# Patient Record
Sex: MALE | Race: WHITE | NOT HISPANIC OR LATINO | Employment: OTHER | URBAN - METROPOLITAN AREA
[De-identification: names, ages, dates, MRNs, and addresses within clinical notes are randomized per-mention and may not be internally consistent; named-entity substitution may affect disease eponyms.]

---

## 2017-10-09 PROBLEM — F32.A DEPRESSION: Status: ACTIVE | Noted: 2017-10-09

## 2017-10-10 PROBLEM — M54.9 BACK PAIN: Status: ACTIVE | Noted: 2017-10-10

## 2017-10-10 PROBLEM — F10.20 UNCOMPLICATED ALCOHOL DEPENDENCE: Status: ACTIVE | Noted: 2017-10-10

## 2017-10-10 PROBLEM — Z72.0 NICOTINE ABUSE: Status: ACTIVE | Noted: 2017-10-10

## 2017-10-10 PROBLEM — F41.9 ANXIETY DISORDER: Status: ACTIVE | Noted: 2017-10-10

## 2017-10-10 PROBLEM — F11.20 UNCOMPLICATED OPIOID DEPENDENCE: Status: ACTIVE | Noted: 2017-10-10

## 2022-07-20 ENCOUNTER — HOSPITAL ENCOUNTER (EMERGENCY)
Facility: HOSPITAL | Age: 47
Discharge: STILL ADMITTED OR ADMINISTRATIVE | End: 2022-07-20
Attending: FAMILY MEDICINE
Payer: MEDICAID

## 2022-07-20 ENCOUNTER — HOSPITAL ENCOUNTER (INPATIENT)
Facility: HOSPITAL | Age: 47
LOS: 6 days | Discharge: HOME HEALTH | DRG: 030 | End: 2022-07-26
Attending: EMERGENCY MEDICINE | Admitting: STUDENT IN AN ORGANIZED HEALTH CARE EDUCATION/TRAINING PROGRAM
Payer: MEDICAID

## 2022-07-20 ENCOUNTER — ANESTHESIA EVENT (OUTPATIENT)
Dept: SURGERY | Facility: HOSPITAL | Age: 47
DRG: 030 | End: 2022-07-20
Payer: MEDICAID

## 2022-07-20 ENCOUNTER — ANESTHESIA (OUTPATIENT)
Dept: SURGERY | Facility: HOSPITAL | Age: 47
DRG: 030 | End: 2022-07-20
Payer: MEDICAID

## 2022-07-20 VITALS
DIASTOLIC BLOOD PRESSURE: 90 MMHG | HEIGHT: 71 IN | OXYGEN SATURATION: 100 % | BODY MASS INDEX: 22.38 KG/M2 | SYSTOLIC BLOOD PRESSURE: 133 MMHG | TEMPERATURE: 97 F | RESPIRATION RATE: 18 BRPM | HEART RATE: 81 BPM

## 2022-07-20 DIAGNOSIS — R20.0 PERINEAL NUMBNESS: Primary | ICD-10-CM

## 2022-07-20 DIAGNOSIS — R68.89 SUSPECTED CAUDA EQUINA SYNDROME: Primary | ICD-10-CM

## 2022-07-20 DIAGNOSIS — N39.0 URINARY TRACT INFECTION WITHOUT HEMATURIA, SITE UNSPECIFIED: ICD-10-CM

## 2022-07-20 DIAGNOSIS — M54.9 BACK PAIN, UNSPECIFIED BACK LOCATION, UNSPECIFIED BACK PAIN LATERALITY, UNSPECIFIED CHRONICITY: ICD-10-CM

## 2022-07-20 DIAGNOSIS — N39.42 URINARY INCONTINENCE WITHOUT SENSORY AWARENESS: ICD-10-CM

## 2022-07-20 DIAGNOSIS — R20.0 NUMBNESS: ICD-10-CM

## 2022-07-20 DIAGNOSIS — R33.8 ACUTE URINARY RETENTION: ICD-10-CM

## 2022-07-20 DIAGNOSIS — K62.89 DECREASED RECTAL SPHINCTER TONE: ICD-10-CM

## 2022-07-20 DIAGNOSIS — R07.9 CHEST PAIN: ICD-10-CM

## 2022-07-20 LAB
ALBUMIN SERPL-MCNC: 3.7 GM/DL (ref 3.5–5)
ALBUMIN/GLOB SERPL: 1.1 RATIO (ref 1.1–2)
ALP SERPL-CCNC: 68 UNIT/L (ref 40–150)
ALT SERPL-CCNC: 16 UNIT/L (ref 0–55)
AMPHET UR QL SCN: NEGATIVE
APPEARANCE UR: ABNORMAL
AST SERPL-CCNC: 15 UNIT/L (ref 5–34)
BACTERIA #/AREA URNS AUTO: ABNORMAL /HPF
BARBITURATE SCN PRESENT UR: NEGATIVE
BASOPHILS # BLD AUTO: 0.06 X10(3)/MCL (ref 0–0.2)
BASOPHILS NFR BLD AUTO: 0.3 %
BENZODIAZ UR QL SCN: NEGATIVE
BILIRUB UR QL STRIP.AUTO: ABNORMAL MG/DL
BILIRUBIN DIRECT+TOT PNL SERPL-MCNC: 0.7 MG/DL
BUN SERPL-MCNC: 17.3 MG/DL (ref 8.9–20.6)
CALCIUM SERPL-MCNC: 9.6 MG/DL (ref 8.4–10.2)
CANNABINOIDS UR QL SCN: NEGATIVE
CHLORIDE SERPL-SCNC: 100 MMOL/L (ref 98–107)
CK SERPL-CCNC: 71 U/L (ref 30–200)
CO2 SERPL-SCNC: 31 MMOL/L (ref 22–29)
COCAINE UR QL SCN: NEGATIVE
COLOR UR AUTO: ABNORMAL
CREAT SERPL-MCNC: 1.03 MG/DL (ref 0.73–1.18)
CRP SERPL-MCNC: 173 MG/L
EOSINOPHIL # BLD AUTO: 0.24 X10(3)/MCL (ref 0–0.9)
EOSINOPHIL NFR BLD AUTO: 1.2 %
ERYTHROCYTE [DISTWIDTH] IN BLOOD BY AUTOMATED COUNT: 13.3 % (ref 11.5–17)
ERYTHROCYTE [SEDIMENTATION RATE] IN BLOOD: 22 MM/HR (ref 0–15)
FENTANYL UR QL SCN: NEGATIVE
GLOBULIN SER-MCNC: 3.5 GM/DL (ref 2.4–3.5)
GLUCOSE SERPL-MCNC: 97 MG/DL (ref 74–100)
GLUCOSE UR QL STRIP.AUTO: NEGATIVE MG/DL
HCT VFR BLD AUTO: 45.8 % (ref 42–52)
HGB BLD-MCNC: 14.6 GM/DL (ref 14–18)
IMM GRANULOCYTES # BLD AUTO: 0.07 X10(3)/MCL (ref 0–0.04)
IMM GRANULOCYTES NFR BLD AUTO: 0.4 %
KETONES UR QL STRIP.AUTO: ABNORMAL MG/DL
LEUKOCYTE ESTERASE UR QL STRIP.AUTO: ABNORMAL UNIT/L
LYMPHOCYTES # BLD AUTO: 2.11 X10(3)/MCL (ref 0.6–4.6)
LYMPHOCYTES NFR BLD AUTO: 10.8 %
MCH RBC QN AUTO: 30.7 PG (ref 27–31)
MCHC RBC AUTO-ENTMCNC: 31.9 MG/DL (ref 33–36)
MCV RBC AUTO: 96.4 FL (ref 80–94)
MDMA UR QL SCN: NEGATIVE
MONOCYTES # BLD AUTO: 1.07 X10(3)/MCL (ref 0.1–1.3)
MONOCYTES NFR BLD AUTO: 5.5 %
NEUTROPHILS # BLD AUTO: 16 X10(3)/MCL (ref 2.1–9.2)
NEUTROPHILS NFR BLD AUTO: 81.8 %
NITRITE UR QL STRIP.AUTO: NEGATIVE
NRBC BLD AUTO-RTO: 0 %
OPIATES UR QL SCN: NEGATIVE
PCP UR QL: NEGATIVE
PH UR STRIP.AUTO: 5.5 [PH]
PH UR: 5.5 [PH] (ref 3–11)
PLATELET # BLD AUTO: 237 X10(3)/MCL (ref 130–400)
PMV BLD AUTO: 10.4 FL (ref 7.4–10.4)
POTASSIUM SERPL-SCNC: 3.7 MMOL/L (ref 3.5–5.1)
PROT SERPL-MCNC: 7.2 GM/DL (ref 6.4–8.3)
PROT UR QL STRIP.AUTO: ABNORMAL MG/DL
RBC # BLD AUTO: 4.75 X10(6)/MCL (ref 4.7–6.1)
RBC #/AREA URNS AUTO: <5 /HPF
RBC UR QL AUTO: ABNORMAL UNIT/L
SODIUM SERPL-SCNC: 138 MMOL/L (ref 136–145)
SP GR UR STRIP.AUTO: 1.02 (ref 1–1.03)
SPECIFIC GRAVITY, URINE AUTO (.000) (OHS): 1.02 (ref 1–1.03)
SQUAMOUS #/AREA URNS AUTO: <5 /HPF
UROBILINOGEN UR STRIP-ACNC: 1 MG/DL
WBC # SPEC AUTO: 19.5 X10(3)/MCL (ref 4.5–11.5)
WBC #/AREA URNS AUTO: 340 /HPF

## 2022-07-20 PROCEDURE — 80053 COMPREHEN METABOLIC PANEL: CPT | Performed by: FAMILY MEDICINE

## 2022-07-20 PROCEDURE — 36415 COLL VENOUS BLD VENIPUNCTURE: CPT | Performed by: EMERGENCY MEDICINE

## 2022-07-20 PROCEDURE — 25000003 PHARM REV CODE 250: Performed by: EMERGENCY MEDICINE

## 2022-07-20 PROCEDURE — 25000003 PHARM REV CODE 250: Performed by: STUDENT IN AN ORGANIZED HEALTH CARE EDUCATION/TRAINING PROGRAM

## 2022-07-20 PROCEDURE — 63600175 PHARM REV CODE 636 W HCPCS

## 2022-07-20 PROCEDURE — 81001 URINALYSIS AUTO W/SCOPE: CPT | Performed by: EMERGENCY MEDICINE

## 2022-07-20 PROCEDURE — 96374 THER/PROPH/DIAG INJ IV PUSH: CPT

## 2022-07-20 PROCEDURE — 63600175 PHARM REV CODE 636 W HCPCS: Performed by: STUDENT IN AN ORGANIZED HEALTH CARE EDUCATION/TRAINING PROGRAM

## 2022-07-20 PROCEDURE — 63600175 PHARM REV CODE 636 W HCPCS: Performed by: EMERGENCY MEDICINE

## 2022-07-20 PROCEDURE — 86140 C-REACTIVE PROTEIN: CPT | Performed by: EMERGENCY MEDICINE

## 2022-07-20 PROCEDURE — 51702 INSERT TEMP BLADDER CATH: CPT

## 2022-07-20 PROCEDURE — 99291 CRITICAL CARE FIRST HOUR: CPT | Mod: 25

## 2022-07-20 PROCEDURE — 36415 COLL VENOUS BLD VENIPUNCTURE: CPT | Performed by: FAMILY MEDICINE

## 2022-07-20 PROCEDURE — 25000003 PHARM REV CODE 250

## 2022-07-20 PROCEDURE — 85651 RBC SED RATE NONAUTOMATED: CPT | Performed by: EMERGENCY MEDICINE

## 2022-07-20 PROCEDURE — 87040 BLOOD CULTURE FOR BACTERIA: CPT | Performed by: NURSE PRACTITIONER

## 2022-07-20 PROCEDURE — 96375 TX/PRO/DX INJ NEW DRUG ADDON: CPT

## 2022-07-20 PROCEDURE — 80307 DRUG TEST PRSMV CHEM ANLYZR: CPT | Performed by: EMERGENCY MEDICINE

## 2022-07-20 PROCEDURE — 71000033 HC RECOVERY, INTIAL HOUR: Performed by: ANESTHESIOLOGY

## 2022-07-20 PROCEDURE — 25000003 PHARM REV CODE 250: Performed by: NURSE PRACTITIONER

## 2022-07-20 PROCEDURE — 25500020 PHARM REV CODE 255: Performed by: EMERGENCY MEDICINE

## 2022-07-20 PROCEDURE — 87077 CULTURE AEROBIC IDENTIFY: CPT | Performed by: EMERGENCY MEDICINE

## 2022-07-20 PROCEDURE — 82550 ASSAY OF CK (CPK): CPT | Performed by: EMERGENCY MEDICINE

## 2022-07-20 PROCEDURE — 85025 COMPLETE CBC W/AUTO DIFF WBC: CPT | Performed by: FAMILY MEDICINE

## 2022-07-20 PROCEDURE — 11000001 HC ACUTE MED/SURG PRIVATE ROOM

## 2022-07-20 PROCEDURE — 25500020 PHARM REV CODE 255: Performed by: STUDENT IN AN ORGANIZED HEALTH CARE EDUCATION/TRAINING PROGRAM

## 2022-07-20 PROCEDURE — A9577 INJ MULTIHANCE: HCPCS | Performed by: STUDENT IN AN ORGANIZED HEALTH CARE EDUCATION/TRAINING PROGRAM

## 2022-07-20 PROCEDURE — S4991 NICOTINE PATCH NONLEGEND: HCPCS | Performed by: NURSE PRACTITIONER

## 2022-07-20 PROCEDURE — 99285 EMERGENCY DEPT VISIT HI MDM: CPT | Mod: 25

## 2022-07-20 RX ORDER — HYDROMORPHONE HYDROCHLORIDE 2 MG/ML
1 INJECTION, SOLUTION INTRAMUSCULAR; INTRAVENOUS; SUBCUTANEOUS
Status: COMPLETED | OUTPATIENT
Start: 2022-07-20 | End: 2022-07-20

## 2022-07-20 RX ORDER — SODIUM CHLORIDE 0.9 % (FLUSH) 0.9 %
10 SYRINGE (ML) INJECTION EVERY 12 HOURS PRN
Status: DISCONTINUED | OUTPATIENT
Start: 2022-07-20 | End: 2022-07-26 | Stop reason: HOSPADM

## 2022-07-20 RX ORDER — PROPOFOL 10 MG/ML
INJECTION, EMULSION INTRAVENOUS
Status: DISCONTINUED | OUTPATIENT
Start: 2022-07-20 | End: 2022-07-31

## 2022-07-20 RX ORDER — OXYCODONE HYDROCHLORIDE 5 MG/1
5 TABLET ORAL EVERY 6 HOURS PRN
Status: DISCONTINUED | OUTPATIENT
Start: 2022-07-20 | End: 2022-07-21

## 2022-07-20 RX ORDER — MEPERIDINE HYDROCHLORIDE 25 MG/ML
25 INJECTION INTRAMUSCULAR; INTRAVENOUS; SUBCUTANEOUS ONCE AS NEEDED
Status: COMPLETED | OUTPATIENT
Start: 2022-07-20 | End: 2022-07-20

## 2022-07-20 RX ORDER — FENTANYL CITRATE 50 UG/ML
INJECTION, SOLUTION INTRAMUSCULAR; INTRAVENOUS
Status: DISCONTINUED | OUTPATIENT
Start: 2022-07-20 | End: 2022-07-31

## 2022-07-20 RX ORDER — FENTANYL CITRATE 50 UG/ML
50 INJECTION, SOLUTION INTRAMUSCULAR; INTRAVENOUS
Status: DISCONTINUED | OUTPATIENT
Start: 2022-07-20 | End: 2022-07-20

## 2022-07-20 RX ORDER — IBUPROFEN 200 MG
16 TABLET ORAL
Status: DISCONTINUED | OUTPATIENT
Start: 2022-07-20 | End: 2022-07-26 | Stop reason: HOSPADM

## 2022-07-20 RX ORDER — ACETAMINOPHEN 325 MG/1
650 TABLET ORAL EVERY 6 HOURS PRN
Status: DISCONTINUED | OUTPATIENT
Start: 2022-07-20 | End: 2022-07-20

## 2022-07-20 RX ORDER — NALOXONE HCL 0.4 MG/ML
0.02 VIAL (ML) INJECTION
Status: DISCONTINUED | OUTPATIENT
Start: 2022-07-20 | End: 2022-07-26 | Stop reason: HOSPADM

## 2022-07-20 RX ORDER — LISINOPRIL 10 MG/1
10 TABLET ORAL DAILY
COMMUNITY
Start: 2021-12-27

## 2022-07-20 RX ORDER — ACETAMINOPHEN 325 MG/1
650 TABLET ORAL EVERY 4 HOURS PRN
Status: DISCONTINUED | OUTPATIENT
Start: 2022-07-20 | End: 2022-07-26 | Stop reason: HOSPADM

## 2022-07-20 RX ORDER — TALC
6 POWDER (GRAM) TOPICAL NIGHTLY PRN
Status: DISCONTINUED | OUTPATIENT
Start: 2022-07-20 | End: 2022-07-26 | Stop reason: HOSPADM

## 2022-07-20 RX ORDER — LORAZEPAM 2 MG/ML
2 INJECTION INTRAMUSCULAR
Status: COMPLETED | OUTPATIENT
Start: 2022-07-20 | End: 2022-07-20

## 2022-07-20 RX ORDER — MEPERIDINE HYDROCHLORIDE 25 MG/ML
INJECTION INTRAMUSCULAR; INTRAVENOUS; SUBCUTANEOUS
Status: COMPLETED
Start: 2022-07-20 | End: 2022-07-20

## 2022-07-20 RX ORDER — HYDROCODONE BITARTRATE AND ACETAMINOPHEN 5; 325 MG/1; MG/1
1 TABLET ORAL EVERY 6 HOURS PRN
Status: DISCONTINUED | OUTPATIENT
Start: 2022-07-20 | End: 2022-07-20

## 2022-07-20 RX ORDER — HYDROMORPHONE HYDROCHLORIDE 2 MG/ML
0.5 INJECTION, SOLUTION INTRAMUSCULAR; INTRAVENOUS; SUBCUTANEOUS EVERY 4 HOURS PRN
Status: DISCONTINUED | OUTPATIENT
Start: 2022-07-20 | End: 2022-07-21

## 2022-07-20 RX ORDER — IBUPROFEN 200 MG
24 TABLET ORAL
Status: DISCONTINUED | OUTPATIENT
Start: 2022-07-20 | End: 2022-07-26 | Stop reason: HOSPADM

## 2022-07-20 RX ORDER — LIDOCAINE HYDROCHLORIDE 20 MG/ML
INJECTION, SOLUTION EPIDURAL; INFILTRATION; INTRACAUDAL; PERINEURAL
Status: DISCONTINUED | OUTPATIENT
Start: 2022-07-20 | End: 2022-07-31

## 2022-07-20 RX ORDER — GLUCAGON 1 MG
1 KIT INJECTION
Status: DISCONTINUED | OUTPATIENT
Start: 2022-07-20 | End: 2022-07-26 | Stop reason: HOSPADM

## 2022-07-20 RX ORDER — IBUPROFEN 200 MG
1 TABLET ORAL DAILY
Status: DISCONTINUED | OUTPATIENT
Start: 2022-07-20 | End: 2022-07-26 | Stop reason: HOSPADM

## 2022-07-20 RX ORDER — BUPRENORPHINE AND NALOXONE 8; 2 MG/1; MG/1
FILM, SOLUBLE BUCCAL; SUBLINGUAL
COMMUNITY
Start: 2021-12-27

## 2022-07-20 RX ORDER — GABAPENTIN 100 MG/1
200 CAPSULE ORAL 3 TIMES DAILY
Status: DISCONTINUED | OUTPATIENT
Start: 2022-07-20 | End: 2022-07-25

## 2022-07-20 RX ORDER — TIZANIDINE 4 MG/1
4 TABLET ORAL ONCE
Status: COMPLETED | OUTPATIENT
Start: 2022-07-20 | End: 2022-07-20

## 2022-07-20 RX ORDER — SIMETHICONE 80 MG
1 TABLET,CHEWABLE ORAL 4 TIMES DAILY PRN
Status: DISCONTINUED | OUTPATIENT
Start: 2022-07-20 | End: 2022-07-26 | Stop reason: HOSPADM

## 2022-07-20 RX ORDER — FENTANYL CITRATE 50 UG/ML
INJECTION, SOLUTION INTRAMUSCULAR; INTRAVENOUS
Status: COMPLETED
Start: 2022-07-20 | End: 2022-07-20

## 2022-07-20 RX ORDER — ONDANSETRON 2 MG/ML
4 INJECTION INTRAMUSCULAR; INTRAVENOUS EVERY 6 HOURS PRN
Status: DISCONTINUED | OUTPATIENT
Start: 2022-07-20 | End: 2022-07-26 | Stop reason: HOSPADM

## 2022-07-20 RX ADMIN — PROPOFOL 200 MG: 10 INJECTION, EMULSION INTRAVENOUS at 01:07

## 2022-07-20 RX ADMIN — HYDROMORPHONE HYDROCHLORIDE 1 MG: 2 INJECTION, SOLUTION INTRAMUSCULAR; INTRAVENOUS; SUBCUTANEOUS at 04:07

## 2022-07-20 RX ADMIN — NICOTINE 1 PATCH: 21 PATCH, EXTENDED RELEASE TRANSDERMAL at 04:07

## 2022-07-20 RX ADMIN — TIZANIDINE 4 MG: 4 TABLET ORAL at 08:07

## 2022-07-20 RX ADMIN — OXYCODONE HYDROCHLORIDE 5 MG: 5 TABLET ORAL at 09:07

## 2022-07-20 RX ADMIN — LORAZEPAM 2 MG: 2 INJECTION INTRAMUSCULAR; INTRAVENOUS at 05:07

## 2022-07-20 RX ADMIN — FENTANYL CITRATE 50 MCG: 50 INJECTION, SOLUTION INTRAMUSCULAR; INTRAVENOUS at 02:07

## 2022-07-20 RX ADMIN — GABAPENTIN 200 MG: 100 CAPSULE ORAL at 09:07

## 2022-07-20 RX ADMIN — HYDROMORPHONE HYDROCHLORIDE 0.5 MG: 2 INJECTION, SOLUTION INTRAMUSCULAR; INTRAVENOUS; SUBCUTANEOUS at 04:07

## 2022-07-20 RX ADMIN — FENTANYL CITRATE 50 MCG: 50 INJECTION, SOLUTION INTRAMUSCULAR; INTRAVENOUS at 01:07

## 2022-07-20 RX ADMIN — LIDOCAINE HYDROCHLORIDE 80 MG: 20 INJECTION, SOLUTION EPIDURAL; INFILTRATION; INTRACAUDAL; PERINEURAL at 01:07

## 2022-07-20 RX ADMIN — CEFTRIAXONE SODIUM 1 G: 1 INJECTION, POWDER, FOR SOLUTION INTRAMUSCULAR; INTRAVENOUS at 04:07

## 2022-07-20 RX ADMIN — GADOBENATE DIMEGLUMINE 20 ML: 529 INJECTION, SOLUTION INTRAVENOUS at 02:07

## 2022-07-20 RX ADMIN — IOPAMIDOL 100 ML: 755 INJECTION, SOLUTION INTRAVENOUS at 06:07

## 2022-07-20 RX ADMIN — SODIUM CHLORIDE, SODIUM GLUCONATE, SODIUM ACETATE, POTASSIUM CHLORIDE AND MAGNESIUM CHLORIDE: 526; 502; 368; 37; 30 INJECTION, SOLUTION INTRAVENOUS at 01:07

## 2022-07-20 RX ADMIN — MEPERIDINE HYDROCHLORIDE 25 MG: 25 INJECTION INTRAMUSCULAR; INTRAVENOUS; SUBCUTANEOUS at 02:07

## 2022-07-20 RX ADMIN — FENTANYL CITRATE 100 MCG: 50 INJECTION, SOLUTION INTRAMUSCULAR; INTRAVENOUS at 02:07

## 2022-07-20 RX ADMIN — HYDROMORPHONE HYDROCHLORIDE 0.5 MG: 2 INJECTION, SOLUTION INTRAMUSCULAR; INTRAVENOUS; SUBCUTANEOUS at 08:07

## 2022-07-20 NOTE — ED PROVIDER NOTES
Encounter Date: 7/20/2022       History     Chief Complaint   Patient presents with    Numbness     Urinary and fecal incontinence, BLE paresthesia x1 week. Recent lumbar spine injury 12/2021     46 y/o male presents to the ER with complaints of bilateral lower extremity weakness requiring to walk with crutches for the past 2 weeks, and for the past 1 week, patient has had urinary incontinence, not feeling when he needs to urinate, and therefore has been wearing diapers.  Patient has a distant history of a lower back pain at work in December 2021 where he was lifting a heavy piece of equipment and twisted at the same time.  No further injuries.  Patient reports feeling constipated.  He tried a suppository this afternoon, but could not feel the suppository after placement.        Review of patient's allergies indicates:   Allergen Reactions    Aspirin Palpitations     Per pt makes his heart race     Past Medical History:   Diagnosis Date    Addiction to drug     Anxiety     Depression     per pt denies hx of depression    Herniated thoracic disc without myelopathy     Pancreatitis      Past Surgical History:   Procedure Laterality Date    NECK SURGERY      gland removed at age 13 to r/o cancer     Family History   Problem Relation Age of Onset    Alcohol abuse Paternal Grandfather      Social History     Tobacco Use    Smoking status: Current Every Day Smoker     Packs/day: 1.00     Years: 1.00     Pack years: 1.00     Types: Cigarettes     Start date: 10/1/2016    Smokeless tobacco: Never Used   Substance Use Topics    Alcohol use: Yes     Comment: occassionaly in a month    Drug use: No     Comment: only tried in the past     Review of Systems   Constitutional: Negative for chills, fatigue and fever.   HENT: Negative for ear pain, rhinorrhea and sore throat.    Eyes: Negative for photophobia and pain.   Respiratory: Negative for cough, shortness of breath and wheezing.    Cardiovascular: Negative for  chest pain.   Gastrointestinal: Negative for abdominal pain, diarrhea, nausea and vomiting.   Genitourinary: Positive for difficulty urinating. Negative for penile pain, penile swelling and scrotal swelling.   Neurological: Positive for weakness. Negative for dizziness and headaches.   All other systems reviewed and are negative.      Physical Exam     Initial Vitals [07/20/22 0049]   BP Pulse Resp Temp SpO2   134/73 83 20 97.3 °F (36.3 °C) 100 %      MAP       --         Physical Exam    Nursing note and vitals reviewed.  Constitutional: He appears well-developed and well-nourished.   HENT:   Head: Normocephalic and atraumatic.   Eyes: EOM are normal. Pupils are equal, round, and reactive to light.   Neck: Neck supple.   Normal range of motion.  Cardiovascular: Normal rate, regular rhythm and normal heart sounds. Exam reveals no gallop and no friction rub.    No murmur heard.  Pulmonary/Chest: Breath sounds normal. No respiratory distress.   Abdominal: Abdomen is soft. Bowel sounds are normal. He exhibits no distension. There is no abdominal tenderness.   Musculoskeletal:         General: Normal range of motion.      Cervical back: Normal range of motion and neck supple.     Neurological: He is alert and oriented to person, place, and time.   No rectal tone, perineal numbness where he cannot sense touching/pain.  Dribbling urine during examination.  Patient able to stand and support weight on lower extremities.   Skin: Skin is warm and dry.   Psychiatric: He has a normal mood and affect. His behavior is normal. Judgment and thought content normal.         ED Course   Procedures  Labs Reviewed   COMPREHENSIVE METABOLIC PANEL - Abnormal; Notable for the following components:       Result Value    Carbon Dioxide 31 (*)     All other components within normal limits   CBC WITH DIFFERENTIAL - Abnormal; Notable for the following components:    WBC 19.5 (*)     MCV 96.4 (*)     MCHC 31.9 (*)     Neut # 16.0 (*)     IG#  0.07 (*)     All other components within normal limits   CBC W/ AUTO DIFFERENTIAL    Narrative:     The following orders were created for panel order CBC Auto Differential.  Procedure                               Abnormality         Status                     ---------                               -----------         ------                     CBC with Differential[[id number]]        Abnormal            Final result                 Please view results for these tests on the individual orders.   URINALYSIS, REFLEX TO URINE CULTURE          Imaging Results    None          Medications - No data to display  Medical Decision Making:   Initial Assessment:   Findings on neuro exam concerning for cauda equina syndrome.  We do not have capabilites to perform MRI, so will transfer to a facility with MRI for testing and also with Neurosurgery in the event an acute pathology is found.  Will obtain basic labs here as well as a post void residual bladder scan while patient is in the ER.             ED Course as of 07/20/22 0304 Wed Jul 20, 2022 0223 Accepted to [facility]. [MW]   0303 Nurse reports post void residual of 800mL.  EMS has arrived to pickup the patient so will transfer patient without placement of rodriguez catheter so as to not delay transfer. [MW]      ED Course User Index  [MW] Brent Heredia MD             Clinical Impression:   Final diagnoses:  [R20.0] Perineal numbness (Primary)  [K62.89] Decreased rectal sphincter tone  [N39.42] Urinary incontinence without sensory awareness  [R33.8] Acute urinary retention          ED Disposition Condition    Transfer to Another Facility Stable              Brent Heredia MD  07/20/22 0231       Brent Heredia MD  07/20/22 0304

## 2022-07-20 NOTE — ANESTHESIA PREPROCEDURE EVALUATION
"                                                                                                             07/20/2022  Pablo Montenegro is a 47 y.o., male with hx substance abuse. In need of anesthesia for MRI.  He is unable to remain still due to chronic pain despite sedation.    "  Chief Complaint   Patient presents with    Back Pain       AASI Tx from [facility] for neurosurgery services regarding cauda equina. Pt. C/O pain to lower back in triage.       48 y/o male with hx of HTN presents to the ED transferred from [facility] due to lower back pain and suspected cauda equina syndrome. Pt says he strained his back lifting a heavy object in December causing lower back pain, however it wasn't until 2 weeks ago he started having difficulties walking. Pt also reports numbness to genital region, urinary incontinence, and no BM for 2 wks.     The history is provided by the patient.   Back Pain   Associated symptoms include numbness, bladder incontinence and weakness. Pertinent negatives include no chest pain, no fever, no headaches and no dysuria. "      Pre-op Assessment    I have reviewed the Patient Summary Reports.     I have reviewed the Nursing Notes. I have reviewed the NPO Status.   I have reviewed the Medications.     Review of Systems  Anesthesia Hx:  No problems with previous Anesthesia  Denies Family Hx of Anesthesia complications.   Denies Personal Hx of Anesthesia complications.   Cardiovascular:  Cardiovascular Normal     Pulmonary:  Pulmonary Normal    Psych:   Psychiatric History          Physical Exam  General: Well nourished, Cooperative, Alert and Oriented    Airway:  Mallampati: II   Mouth Opening: Normal  TM Distance: Normal  Tongue: Normal  Neck ROM: Normal ROM    Chest/Lungs:  Clear to auscultation, Normal Respiratory Rate    Heart:  Rate: Normal  Rhythm: Regular Rhythm        Anesthesia Plan  Type of Anesthesia, risks & benefits discussed:    Anesthesia Type: Gen Supraglottic Airway  Intra-op Monitoring " Plan: Standard ASA Monitors  Post Op Pain Control Plan: multimodal analgesia  Induction:  IV  Airway Plan: , Post-Induction  Informed Consent: Informed consent signed with the Patient and all parties understand the risks and agree with anesthesia plan.  All questions answered.   ASA Score: 2  Day of Surgery Review of History & Physical: H&P Update referred to the surgeon/provider.    Ready For Surgery From Anesthesia Perspective.     .

## 2022-07-20 NOTE — TRANSFER OF CARE
"Anesthesia Transfer of Care Note    Patient: Pablo Montenegro    Procedure(s) Performed: Procedure(s) (LRB):  MRI ([facility]) (N/A)    Patient location: PACU    Anesthesia Type: general    Transport from OR: Transported from OR on 2-3 L/min O2 by NC with adequate spontaneous ventilation    Post pain: adequate analgesia    Post assessment: no apparent anesthetic complications    Post vital signs: stable    Level of consciousness: responds to stimulation    Nausea/Vomiting: no nausea/vomiting    Complications: none    Transfer of care protocol was followedComments: Detailed report with handoff to licensed provider complete      Last vitals:   Visit Vitals  BP (!) 145/89   Pulse 85   Temp 36.6 °C (97.9 °F) (Temporal)   Resp 20   Ht 5' 11" (1.803 m)   Wt 88.5 kg (195 lb)   SpO2 96%   BMI 27.20 kg/m²     "

## 2022-07-20 NOTE — H&P
"[facility] Hospital Medicine History & Physical Examination       Patient Name: Pablo Montenegro  MRN: [id number]  Patient Class: IP- Inpatient   Admission Date: 7/20/2022   Admitting Physician: CHRIS Service   Length of Stay: 0  Attending Physician: Dr. Reed Aleman  Primary Care Provider: [facility]  Face-to-Face encounter date: 07/20/2022  Code Status: Full Code  Chief Complaint: Back Pain (AASI Tx from [facility] for neurosurgery services regarding cauda equina. Pt. C/O pain to lower back in triage. )        Patient information was obtained from patient, patient's family, past medical records and ER records.     HISTORY OF PRESENT ILLNESS:   Pablo Montenegro is a 47 y.o. male who  has a past medical history of Addiction to drug, Anxiety, Depression, Herniated thoracic disc without myelopathy, and Pancreatitis., HTN; presents to the ED at [facility] with reports of lower back pain and difficulty  Urinating.  Patient reports he has been having bowel and bladder dysfunction with retention over  The past 2 weeks.  His significant other is at the bedside (Andrae) who is providing some of the history.  It is reported patient "lifted up on an island"  and strained his lower back. IT is reported this happed about a couple of months ago.   Patient reports the symptoms of lower back pain progressed and got worse, along with urinary retention and no bowel movement over the past 2 weeks.  Patient tried using a suppository to facilitate a bowel movement however he reports he could not feel the suppository and it produced no relief. Pt also reports urinary incontinence as well with needing wear diapers.  PT presented to the ED at [facility]. Work up there revealed greater than 800 ml in patient bladder post ovid residual. Pt was transferred to [facility] for   Further imaging in neurosurgical evaluation.  Neurosurgery services were consulted. CT of his L spine was completed upon arrival and was significant for suspected " right central disc protrusion at L4-5 with moderate narrowing of the canal and likely impingement of the descending right L5 nerve roots. MRI of the lumbar with and without contrast  demonstrated  Transitional lumbosacral anatomy with partial sacralization of L5, suspected right central disc protrusion at L4-L5 with moderate narrowing of canal and likely impingement of descending right L5 nerve root; moderate to severe neural foraminal narrowing on the right at L4-5 moderate on the left.   Patient reports no chest pain, shortness a breath, fever, chills, cough, congestion, any sick contacts.  Patient is admitted to hospital medicine services for further management.  Neurosurgery services consult.    PAST MEDICAL HISTORY:     Past Medical History:   Diagnosis Date    Addiction to drug     Anxiety     Depression     per pt denies hx of depression    Herniated thoracic disc without myelopathy     Pancreatitis        PAST SURGICAL HISTORY:     Past Surgical History:   Procedure Laterality Date    NECK SURGERY      gland removed at age 13 to r/o cancer       ALLERGIES:   Aspirin    FAMILY HISTORY:   Reviewed and negative    SOCIAL HISTORY:     Social History     Tobacco Use    Smoking status: Current Every Day Smoker     Packs/day: 1.00     Years: 1.00     Pack years: 1.00     Types: Cigarettes     Start date: 10/1/2016    Smokeless tobacco: Never Used   Substance Use Topics    Alcohol use: Yes     Comment: occassionaly in a month        HOME MEDICATIONS:   As documented  Prior to Admission medications    Medication Sig Start Date End Date Taking? Authorizing Provider   busPIRone (BUSPAR) 15 MG tablet Take 1 tablet (15 mg total) by mouth 2 (two) times daily. 10/18/17 10/18/18  Taisha Mendez MD   cloNIDine (CATAPRES) 0.1 MG tablet Take 1 tablet (0.1 mg total) by mouth 2 (two) times daily. 10/18/17 10/20/17  Taisha Mendez MD   folic acid (FOLVITE) 1 MG tablet Take 1 tablet (1 mg total) by mouth once  daily. 10/19/17 10/19/18  Taisha Mendez MD   gabapentin (NEURONTIN) 600 MG tablet Take 1 tablet (600 mg total) by mouth 3 (three) times daily. 10/18/17 10/18/18  Taisha Mendez MD   mirtazapine (REMERON) 15 MG tablet Take 1 tablet (15 mg total) by mouth every evening. 10/18/17 10/18/18  Taisha Mendez MD   multivitamin (THERAGRAN) tablet Take 1 tablet by mouth once daily. 10/19/17   Taisha Mendez MD   naltrexone microspheres 380 mg SSRR kit Inject 1 each (380 mg total) into the muscle once. 11/17/17 11/17/17  Taisha Mendez MD   nicotine (NICODERM CQ) 14 mg/24 hr Place 1 patch onto the skin once daily. 10/19/17   Taisha Mendez MD   olanzapine (ZYPREXA) 10 MG tablet Take 1 tablet (10 mg total) by mouth once daily. 10/19/17 10/19/18  Taisha Mendez MD   thiamine 100 MG tablet Take 1 tablet (100 mg total) by mouth once daily. 10/19/17   Taisha Mendez MD       REVIEW OF SYSTEMS:   Except as documented, all other systems reviewed and negative     PHYSICAL EXAM:     VITAL SIGNS: 24 HRS MIN & MAX LAST   Temp  Min: 97.3 °F (36.3 °C)  Max: 97.9 °F (36.6 °C) 97.9 °F (36.6 °C)   BP  Min: 133/90  Max: 163/85 (!) 147/95   Pulse  Min: 81  Max: 93  93   Resp  Min: 14  Max: 20 16   SpO2  Min: 96 %  Max: 100 % 98 %       General appearance: Well-developed, well-nourished male, lower back pain complaints, NAD, HENT: Atraumatic head. Moist mucous membranes of oral cavity.  Eyes: Normal extraocular movements.   Neck: Supple,atraumatic.   Lungs:diminished  bilaterally. No wheezing present.   Symmetrical expansion, unlabored respirations O2 saturation stable  Heart: Regular rate and rhythm. S1 and S2 present with no murmurs/gallop/rub. No pedal edema. No JVD present.   Abdomen: Soft, non-distended, non-tender. No rebound tenderness/guarding. Bowel sounds are normal.   Extremities:MERRITT, atraumatic  Skin: warm and dry  Genitourinary:deferred- patient refused  Neuro: Motor and sensory exams grossly intact.  Good tone. Muscle strength 5/5 in all 4 extremities, reports decreased sensation to perineal area,   Psych/mental status: Appropriate mood and affect. Responds appropriately to questions.     LABS AND IMAGING:     Recent Labs   Lab 07/20/22 0212   WBC 19.5*   RBC 4.75   HGB 14.6   HCT 45.8   MCV 96.4*   MCH 30.7   MCHC 31.9*   RDW 13.3      MPV 10.4       Recent Labs   Lab 07/20/22 0212      K 3.7   CO2 31*   BUN 17.3   CREATININE 1.03   CALCIUM 9.6   ALBUMIN 3.7   ALKPHOS 68   ALT 16   AST 15   BILITOT 0.7       Microbiology Results (last 7 days)     Procedure Component Value Units Date/Time    Blood Culture (site 1) [[id number]] Collected: 07/20/22 0931    Order Status: Resulted Specimen: Blood Updated: 07/20/22 0934    Blood Culture (site 2) [[id number]] Collected: 07/20/22 0931    Order Status: Resulted Specimen: Blood Updated: 07/20/22 0933    Urine culture [[id number]] Collected: 07/20/22 0342    Order Status: Sent Specimen: Urine, Catheterized Updated: 07/20/22 0355           MRI Lumbar Spine W WO Cont  Narrative: EXAMINATION:  MRI LUMBAR SPINE W WO CONTRAST    CLINICAL HISTORY:  Low back pain, cauda equina syndrome suspected;    TECHNIQUE:  Multiplanar multisequence MR images of the lumbar spine are obtained with and without contrast.    COMPARISON:  CT of the lumbar spine dated 07/20/2022    FINDINGS:  There is transitional lumbosacral anatomy with the lowest disc space labeled as L5-S1 and partial sacralization at L5. Alignment is normal without subluxation.  Vertebral body heights are maintained.  There is mild degenerate endplate edema at L4-5.    The conus terminates at the level of L1.  It is normal in signal and contour.  There is enhancement of the cauda equina nerve roots.    Disc spaces, spinal canal and neural foramina are as follows:    L1-L2: No disc herniation.  No significant spinal canal or neural foraminal stenosis.    L2-L3: Disc bulge and facet hypertrophy with mild narrowing  of the spinal canal.  Mild bilateral neural foraminal stenosis.    L3-L4: Disc bulge and facet hypertrophy with mild narrowing of the spinal canal.  Mild bilateral neural foraminal stenosis.    L4-L5: A right central disc extrusion measures 9 x 12 x 17 mm in size and causes severe spinal canal stenosis with complete effacement of the CSF space and impingement on the cord equina nerve roots.  There is mild-to-moderate bilateral neural foraminal stenosis.    L5-S1: No disc herniation.  Bilateral facet hypertrophy.  No significant spinal canal or neural foraminal stenosis.    No significant abnormality within the visualized paraspinous musculature.  Impression: 1. 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  2. Large right central disc extrusion at L4-5 with severe canal stenosis.  3. Cauda equina nerve root enhancement is nonspecific and may be inflammatory.  4. Mild degenerative narrowing of the canal at L2-L3 and L4-5.  5. Multilevel neural foraminal stenoses as described.    Electronically signed by: Margo Sigala  Date:    07/20/2022  Time:    15:01  CT Lumbar Spine With Contrast  Narrative: EXAMINATION:  CT LUMBAR SPINE WITH CONTRAST    CLINICAL HISTORY:  Low back pain, cauda equina syndrome suspected;    TECHNIQUE:  CT images of the lumbar spine with contrast.  Axial, coronal, and sagittal reformatted images were obtained. Dose length product is 622 mGycm. Automatic exposure control, adjustment of mA/kV or iterative reconstruction technique was used to limit radiation dose.    COMPARISON:  CT abdomen pelvis dated 12/27/2022    FINDINGS:  There is transitional lumbosacral anatomy with the lowest fully formed disc space labeled as L5-S1 and partial sacralization of L5.  Lumbar alignment is normal.  The vertebral body heights are maintained. There is no acute fracture identified.  There is mild disc height loss at L4-5 and L5-S1 with small multilevel marginal osteophytes.   There is mild facet arthropathy. There is mild degenerative narrowing of the canal at L2-L3 and L3-L4 with disc bulges and facet hypertrophy.  At L4-L5, there is a disc bulge with right central disc protrusion which causes moderate narrowing of the canal and likely impingement on the descending right L5 nerve roots.  There is moderate to severe narrowing of the right L4-L5 neural foramina, moderate on the left.  The paraspinal soft tissues are unremarkable.  There is no drainable fluid collection.  Impression: 1. 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  2. Suspected right central disc protrusion at L4-L5 with moderate narrowing of the canal and likely impingement on the descending right L5 nerve roots.  3. Moderate to severe neural foraminal narrowing on the right at L4-L5, moderate on the left.    Electronically signed by: Margo Sigala  Date:    07/20/2022  Time:    08:07        ASSESSMENT & PLAN:   ASSESSMENT:  Acute cauda equina syndrome - suspected per MRI  Acute urinary retention  Acute lumbar stenosis- L4-5 with L5 root impingement  HTN- essential  Lumbar strain- per pt reports 2 week ago  Nicotine dependence- cigarettes    Hx of anxiety, depression, poly-substance abuse (denies any recent use)    PLAN:  Consult  Neurosurgery services appreciate assistance and recommendations   Fall precautions   Prn pain management  Nicotine patch daily  Accurate I&O  Neuro checks q 4 hours  Hold AC therapy as pt may have pending surgery  Labs in the am  Keep indwelling catheter with urinary retention        VTE Prophylaxis: SCD for DVT prophylaxis and will be advised to be as mobile as possible . Pt has suspected pending neurological intervention for  Cauda equina syndrome pending neural surgery evaluation.    Patient condition:  Fair    __________________________________________________________________________  INPATIENT LIST OF MEDICATIONS     Scheduled Meds:   cefTRIAXone  (ROCEPHIN) IVPB  1 g Intravenous Q24H    gabapentin  200 mg Oral TID     Continuous Infusions:  PRN Meds:.acetaminophen, dextrose 10%, dextrose 10%, glucagon (human recombinant), glucose, glucose, HYDROmorphone, melatonin, naloxone, ondansetron, oxyCODONE, simethicone, sodium chloride 0.9%      IIke FNP have reviewed and discussed the case with Dr. Reed Aleman. Please see the following addendum for further assessment and plan from there attending MD.  CHRISTINE Cunningham   07/20/2022    ________________________________________________________________________________    MD Addendum:  I, Dr. Aleman assumed care of this patient today  For the patient encounter, I performed the substantive portion of the visit, I reviewed the NP/PA documentation, treatment plan, and medical decision making.  I had face to face time with this patient     Seen and examined the patient agree with above history physical examination.  Patient is presenting to the hospital with bilateral lower extremity weakness sensory problems saddle paresthesia.  If CT is concerning for spinal stenosis and compression.  MRI lumbar spine ordered and revealed transitional lumbosacral anatomy with partial sacralization of L5 severe L4-5 channel stenosis right central disc extrusion, cover the [place] nerve root enhancement is nonspecific but may be inflammatory.  Neurosurgery is on board and evaluated the patient.  White blood cell count was elevated he does not have any abdominal symptoms respiratory symptoms however showed UA is positive for bacteria, will send urine cultures    Agree with above exam.     Plan:   - ceftriaxone for UTI,   - place rodriguez.   - patient will likely need surgical intervention on this admission according to the MRI report.  -NPO after midnight.  -EKG.  -repeat labs tomorrow a.m..  -patient states that he does not take anything the sites lisinopril 5 mg.  Will restart.  -he smokes 2 packs a day and like to see  nicotine patch patch.       All diagnosis and differential diagnosis have been reviewed; assessment and plan has been documented; I have personally reviewed the labs and test results that are presently available; I have reviewed the patients medication list; I have reviewed the consulting providers response and recommendations. I have reviewed or attempted to review medical records based upon their availability.    All of the patient and family questions have been addressed and answered. Patient's is agreeable to the above stated plan. I will continue to monitor closely and make adjustments to medical management as needed.

## 2022-07-20 NOTE — CONSULTS
[facility] - Emergency Dept  Neurosurgery  Consult Note    Inpatient consult to Neurosurgery  Consult performed by: MARY Loyd  Consult ordered by: Breanne Newby MD  Reason for consult: Back pain, urinary retention, bowel dysfunction        Subjective:     Chief Complaint/Reason for Admission: Back pain, left leg pain, urinary retention, bowel dysfunction, worsening over the last two weeks    History of Present Illness: Patient is a 46 y/o male with hx of HTN, who presents to the ED transferred from [facility] due to lower back pain and suspected cauda equina syndrome. Pt says he strained his back lifting a heavy object in December causing lower back pain, however it wasn't until 2 weeks ago he started having difficulties walking. Pt also reports numbness to genital region, urinary incontinence, and no BM for 2 wks. He states he has been walking with a walker for the last two weeks and has had increasing numbness in the left LE.  CT of his L spine was completed upon arrival and was significant for suspected right central disc protrusion at L4-5 with moderate narrowing of the canal and likely impingement of the descending right L5 nerve roots. Dr. Dubois has been consulted for evaluation and treatment recommendations.  On PE today he is sitting up in bed, NAD. He c/o mild low back pain. He does have burning in his left posterior calf. He c/o incontinence, worsening over the last 3 days. He has not had a bowl movement in two weeks according to him. He did have a rodriguez inserted in the ED and put out 1400.       Review of patient's allergies indicates:   Allergen Reactions    Aspirin Palpitations     Per pt makes his heart race       Past Medical History:   Diagnosis Date    Addiction to drug     Anxiety     Depression     per pt denies hx of depression    Herniated thoracic disc without myelopathy     Pancreatitis      Past Surgical History:   Procedure Laterality Date    NECK SURGERY       gland removed at age 13 to r/o cancer     Family History     Problem Relation (Age of Onset)    Alcohol abuse Paternal Grandfather        Tobacco Use    Smoking status: Current Every Day Smoker     Packs/day: 1.00     Years: 1.00     Pack years: 1.00     Types: Cigarettes     Start date: 10/1/2016    Smokeless tobacco: Never Used   Substance and Sexual Activity    Alcohol use: Yes     Comment: occassionaly in a month    Drug use: No     Comment: only tried in the past    Sexual activity: Yes     Partners: Female     Birth control/protection: None     Review of Systems  Objective:     12 pt ROS WNL, except for HPI  Weight: 88.5 kg (195 lb)  Body mass index is 27.2 kg/m².  Vital Signs (Most Recent):  Temp: 97.9 °F (36.6 °C) (07/20/22 0319)  Pulse: 85 (07/20/22 0700)  Resp: 20 (07/20/22 0700)  BP: (!) 145/89 (07/20/22 0700)  SpO2: 96 % (07/20/22 0700) Vital Signs (24h Range):  Temp:  [97.3 °F (36.3 °C)-97.9 °F (36.6 °C)] 97.9 °F (36.6 °C)  Pulse:  [81-89] 85  Resp:  [14-20] 20  SpO2:  [96 %-100 %] 96 %  BP: (133-155)/(73-97) 145/89                          Urethral Catheter 07/20/22 0348 Silicone 16 Fr. (Active)   Site Assessment Clean;Intact;Dry 07/20/22 0350   Collection Container Standard drainage bag 07/20/22 0350   Securement Method secured to top of thigh w/ adhesive device 07/20/22 0350   Reason for Continuing Urinary Catheterization Urinary retention 07/20/22 0350   CAUTI Prevention Bundle Drainage bag/urimeter below bladder;Intact seal between catheter & drainage tubing;Drainage bag/urimeter off the floor;Drainage bag/urimeter not overfilled (<2/3 full);No dependent loops or kinks;Sheeting clip in use 07/20/22 0350   Output (mL) 1400 mL 07/20/22 0350       Physical Exam:    Constitutional: He appears well-developed and well-nourished.     Eyes: Pupils are equal, round, and reactive to light. EOM are normal.     Cardiovascular: Normal rate and regular rhythm.     Abdominal: Soft. Bowel sounds are normal.      Psych/Behavior: He is alert. He is oriented to person, place, and time. He has a normal mood and affect.     Musculoskeletal:        Neck: Range of motion is full.        Back: Range of motion is full.        Right Upper Extremities: Range of motion is full. Muscle strength is 5/5.        Left Upper Extremities: Range of motion is full. Muscle strength is 5/5.       Right Lower Extremities: Range of motion is full. Muscle strength is 5/5.        Left Lower Extremities: Range of motion is full. Muscle strength is 5/5.     Neurological:        DTRs: DTRs are DTRS NORMAL AND SYMMETRICnormal and symmetric.        Cranial nerves: Cranial nerve(s) II, III, IV, V, VI, VII, VIII, IX, X, XI and XII are intact.       Significant Labs:  Recent Labs   Lab 07/20/22 0212      K 3.7   CO2 31*   BUN 17.3   CREATININE 1.03   CALCIUM 9.6     Recent Labs   Lab 07/20/22 0212   WBC 19.5*   HGB 14.6   HCT 45.8        No results for input(s): LABPT, INR, APTT in the last 48 hours.  Microbiology Results (last 7 days)     Procedure Component Value Units Date/Time    Blood Culture (site 1) [[id number]] Collected: 07/20/22 0931    Order Status: Resulted Specimen: Blood Updated: 07/20/22 0934    Blood Culture (site 2) [[id number]] Collected: 07/20/22 0931    Order Status: Resulted Specimen: Blood Updated: 07/20/22 0933    Urine culture [[id number]] Collected: 07/20/22 0342    Order Status: Sent Specimen: Urine, Catheterized Updated: 07/20/22 0355          Significant Diagnostics:  CT Lumbar Spine With Contrast [[id number]] Resulted: 07/20/22 0807   Order Status: Completed Updated: 07/20/22 0810   Narrative:     EXAMINATION:   CT LUMBAR SPINE WITH CONTRAST     CLINICAL HISTORY:   Low back pain, cauda equina syndrome suspected;     TECHNIQUE:   CT images of the lumbar spine with contrast.  Axial, coronal, and sagittal reformatted images were obtained. Dose length product is 622 mGycm. Automatic exposure control, adjustment of  mA/kV or iterative reconstruction technique was used to limit radiation dose.     COMPARISON:   CT abdomen pelvis dated 12/27/2022     FINDINGS:   There is transitional lumbosacral anatomy with the lowest fully formed disc space labeled as L5-S1 and partial sacralization of L5.  Lumbar alignment is normal.  The vertebral body heights are maintained. There is no acute fracture identified.  There is mild disc height loss at L4-5 and L5-S1 with small multilevel marginal osteophytes.  There is mild facet arthropathy. There is mild degenerative narrowing of the canal at L2-L3 and L3-L4 with disc bulges and facet hypertrophy.  At L4-L5, there is a disc bulge with right central disc protrusion which causes moderate narrowing of the canal and likely impingement on the descending right L5 nerve roots.  There is moderate to severe narrowing of the right L4-L5 neural foramina, moderate on the left.  The paraspinal soft tissues are unremarkable.  There is no drainable fluid collection.    Impression:       1. 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   2. Suspected right central disc protrusion at L4-L5 with moderate narrowing of the canal and likely impingement on the descending right L5 nerve roots.   3. Moderate to severe neural foraminal narrowing on the right at L4-L5, moderate on the left.          Assessment/Plan:     Active Diagnoses:    Diagnosis Date Noted POA    PRINCIPAL PROBLEM:  Suspected cauda equina syndrome [R68.89] 07/20/2022 Unknown      Problems Resolved During this Admission:     He is motor intact to bilateral LE.  CT was reviewed by DR. Dubois  We have ordered a MRI of his L spine with anesthesia to further assess  Further recs to follow per Dr. Dubois once imaging obtained  Continue rodriguez  Hold anticoagulation for now    Thank you for your consult.     MARY Loyd  Neurosurgery  [facility] - Emergency Dept

## 2022-07-20 NOTE — ED NOTES
Bed: 15  Expected date:   Expected time:   Means of arrival:   Comments:  [facility] Tx

## 2022-07-20 NOTE — PT/OT/SLP PROGRESS
Physical Therapy      Patient Name:  Pablo Montenegro   MRN:  [id number]    PT eval orders received. Patient pending MRI under anesthesia with neurosurgery recs. PT to follow up as appropriate.

## 2022-07-20 NOTE — ED PROVIDER NOTES
Encounter Date: 7/20/2022    SCRIBE #1 NOTE: I, Olena Turner, am scribing for, and in the presence of,  Breanne Newby MD. I have scribed the following portions of the note - Other sections scribed: HPI, ROS, Physical Exam.       History     Chief Complaint   Patient presents with    Back Pain     [facility] Tx from [facility] for neurosurgery services regarding cauda equina. Pt. C/O pain to lower back in triage.      46 y/o male with hx of HTN presents to the ED transferred from [facility] due to lower back pain and suspected cauda equina syndrome. Pt says he strained his back lifting a heavy object in December causing lower back pain, however it wasn't until 2 weeks ago he started having difficulties walking. Pt also reports numbness to genital region, urinary incontinence, and no BM for 2 wks.    The history is provided by the patient.   Back Pain   This is a new problem. The current episode started several weeks ago. The problem occurs constantly. The problem has been gradually worsening. The pain is associated with no known injury. The pain is present in the lumbar spine. The quality of the pain is described as burning. The pain radiates to the left leg. The pain is at a severity of 10/10. The symptoms are aggravated by bending. Associated symptoms include numbness, bladder incontinence and weakness. Pertinent negatives include no chest pain, no fever, no headaches and no dysuria.     Review of patient's allergies indicates:   Allergen Reactions    Aspirin Palpitations     Per pt makes his heart race     Past Medical History:   Diagnosis Date    Addiction to drug     Anxiety     Depression     per pt denies hx of depression    Herniated thoracic disc without myelopathy     Pancreatitis      Past Surgical History:   Procedure Laterality Date    NECK SURGERY      gland removed at age 13 to r/o cancer     Family History   Problem Relation Age of Onset    Alcohol abuse Paternal Grandfather      Social History     Tobacco Use     Smoking status: Current Every Day Smoker     Packs/day: 1.00     Years: 1.00     Pack years: 1.00     Types: Cigarettes     Start date: 10/1/2016    Smokeless tobacco: Never Used   Substance Use Topics    Alcohol use: Yes     Comment: occassionaly in a month    Drug use: No     Comment: only tried in the past     Review of Systems   Constitutional: Negative for fever.   Eyes: Negative for visual disturbance.   Respiratory: Negative for cough and shortness of breath.    Cardiovascular: Negative for chest pain, palpitations and leg swelling.   Gastrointestinal: Negative for diarrhea, nausea and vomiting.   Genitourinary: Positive for bladder incontinence. Negative for dysuria and hematuria.        Urinary incontinence    Musculoskeletal: Positive for back pain. Negative for neck pain.   Skin: Negative for rash.   Neurological: Positive for weakness and numbness. Negative for headaches.   All other systems reviewed and are negative.      Physical Exam     Initial Vitals [07/20/22 0319]   BP Pulse Resp Temp SpO2   (!) 142/86 81 16 97.9 °F (36.6 °C) 100 %      MAP       --         Physical Exam    Nursing note and vitals reviewed.  Constitutional: No distress.   HENT:   Head: Normocephalic and atraumatic.   Mouth/Throat: Oropharynx is clear and moist.   Eyes: Conjunctivae are normal.   Neck: Neck supple.   Normal range of motion.  Cardiovascular: Normal rate, regular rhythm and intact distal pulses.   No murmur heard.  Pulses:       Dorsalis pedis pulses are 2+ on the right side and 2+ on the left side.   Pulmonary/Chest: Breath sounds normal. No respiratory distress. He exhibits no tenderness.   Abdominal: Abdomen is soft. Bowel sounds are normal. He exhibits no distension. There is abdominal tenderness (mild).   Musculoskeletal:         General: Normal range of motion.      Cervical back: Normal range of motion and neck supple.      Lumbar back: Normal range of motion.     Neurological: He is alert and oriented  to person, place, and time. He has normal strength. No cranial nerve deficit or sensory deficit.   Decreased perineal sensation  Intact plantar and dorsiflexion, strength intact to knee, hip flexion and extension   Skin: Skin is warm and dry.         ED Course   Critical Care    Date/Time: 7/20/2022 3:36 AM  Performed by: Breanne Newby MD  Authorized by: Breanne Newby MD   Direct patient critical care time: 17 minutes  Additional history critical care time: 5 minutes  Ordering / reviewing critical care time: 5 minutes  Documentation critical care time: 4 minutes  Consulting other physicians critical care time: 5 minutes  Total critical care time (exclusive of procedural time) : 36 minutes  Critical care time was exclusive of separately billable procedures and treating other patients.  Critical care was necessary to treat or prevent imminent or life-threatening deterioration of the following conditions: CNS failure or compromise.  Critical care was time spent personally by me on the following activities: discussions with consultants, evaluation of patient's response to treatment, examination of patient, ordering and performing treatments and interventions, ordering and review of radiographic studies, pulse oximetry and re-evaluation of patient's condition.        Labs Reviewed   URINALYSIS, REFLEX TO URINE CULTURE - Abnormal; Notable for the following components:       Result Value    Color, UA Dark Yellow (*)     Appearance, UA Cloudy (*)     Protein, UA 1+ (*)     Ketones, UA Trace (*)     Blood, UA 2+ (*)     Bilirubin, UA 1+ (*)     Leukocyte Esterase, UA 3+ (*)     All other components within normal limits   SEDIMENTATION RATE, AUTOMATED - Abnormal; Notable for the following components:    Sed Rate 22 (*)     All other components within normal limits   C-REACTIVE PROTEIN - Abnormal; Notable for the following components:    C-Reactive Protein 173.00 (*)     All other components within normal limits    URINALYSIS, MICROSCOPIC - Abnormal; Notable for the following components:    WBC,  (*)     Bacteria, UA 4+ (*)     All other components within normal limits   DRUG SCREEN, URINE (BEAKER) - Normal    Narrative:     Cut off concentrations:    Amphetamines - 1000 ng/ml  Barbiturates - 200 ng/ml  Benzodiazepine - 200 ng/ml  Cannabinoids (THC) - 50 ng/ml  Cocaine - 300 ng/ml  Fentanyl - 1.0 ng/ml  MDMA - 500 ng/ml  Opiates - 300 ng/ml   Phencyclidine (PCP) - 25 ng/ml    Specimen submitted for drug analysis and tested for pH and specific gravity in order to evaluate sample integrity. Suspect tampering if specific gravity is <1.003 and/or pH is not within the range of 4.5 - 8.0  False negatives may result form substances such as bleach added to urine.  False positives may result for the presence of a substance with similar chemical structure to the drug or its metabolite.    This test provides only a PRELIMINARY analytical test result. A more specific alternate chemical method must be used in order to obtain a confirmed analytical result. Gas chromatography/mass spectrometry (GC/MS) is the preferred confirmatory method. Other chemical confirmation methods are available. Clinical consideration and professional judgement should be applied to any drug of abuse test result, particularly when preliminary positive results are used.    Positive results will be confirmed only at the physicians request. Unconfirmed screening results are to be used only for medical purposes (treatment).        CK - Normal   CULTURE, URINE   BLOOD CULTURE [facility]   BLOOD CULTURE [facility]          Imaging Results                              CT Lumbar Spine With Contrast (Final result)  Result time 07/20/22 08:07:30    Final result by Margo Sigala MD (07/20/22 08:07:30)                 Impression:      1. 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  2. Suspected right central disc protrusion at L4-L5 with  moderate narrowing of the canal and likely impingement on the descending right L5 nerve roots.  3. Moderate to severe neural foraminal narrowing on the right at L4-L5, moderate on the left.      Electronically signed by: Margo Sigala  Date:    07/20/2022  Time:    08:07             Narrative:    EXAMINATION:  CT LUMBAR SPINE WITH CONTRAST    CLINICAL HISTORY:  Low back pain, cauda equina syndrome suspected;    TECHNIQUE:  CT images of the lumbar spine with contrast.  Axial, coronal, and sagittal reformatted images were obtained. Dose length product is 622 mGycm. Automatic exposure control, adjustment of mA/kV or iterative reconstruction technique was used to limit radiation dose.    COMPARISON:  CT abdomen pelvis dated 12/27/2022    FINDINGS:  There is transitional lumbosacral anatomy with the lowest fully formed disc space labeled as L5-S1 and partial sacralization of L5.  Lumbar alignment is normal.  The vertebral body heights are maintained. There is no acute fracture identified.  There is mild disc height loss at L4-5 and L5-S1 with small multilevel marginal osteophytes.  There is mild facet arthropathy. There is mild degenerative narrowing of the canal at L2-L3 and L3-L4 with disc bulges and facet hypertrophy.  At L4-L5, there is a disc bulge with right central disc protrusion which causes moderate narrowing of the canal and likely impingement on the descending right L5 nerve roots.  There is moderate to severe narrowing of the right L4-L5 neural foramina, moderate on the left.  The paraspinal soft tissues are unremarkable.  There is no drainable fluid collection.                                 Medications   sodium chloride 0.9% flush 10 mL (has no administration in time range)   naloxone 0.4 mg/mL injection 0.02 mg (has no administration in time range)   glucose chewable tablet 16 g (has no administration in time range)   glucose chewable tablet 24 g (has no administration in time range)   glucagon (human  recombinant) injection 1 mg (has no administration in time range)   dextrose 10% bolus 125 mL (has no administration in time range)   dextrose 10% bolus 250 mL (has no administration in time range)   acetaminophen tablet 650 mg (has no administration in time range)   ondansetron injection 4 mg (has no administration in time range)   melatonin tablet 6 mg (6 mg Oral Given 7/21/22 0138)   simethicone chewable tablet 80 mg (has no administration in time range)   cefTRIAXone (ROCEPHIN) 1 g in dextrose 5 % in water (D5W) 5 % 50 mL IVPB (MB+) (0 g Intravenous Stopped 7/20/22 1640)   HYDROmorphone (PF) injection 0.5 mg (0.5 mg Intravenous Given 7/21/22 0413)   oxyCODONE immediate release tablet 5 mg (5 mg Oral Given 7/20/22 2127)   gabapentin capsule 200 mg (200 mg Oral Given 7/20/22 2140)   nicotine 21 mg/24 hr 1 patch (1 patch Transdermal Patch Applied 7/20/22 1615)   HYDROmorphone (PF) injection 1 mg (1 mg Intravenous Given 7/20/22 0445)   lorazepam injection 2 mg (2 mg Intravenous Given 7/20/22 0502)   iopamidoL (ISOVUE-370) injection 100 mL (100 mLs Intravenous Given 7/20/22 0653)   tiZANidine tablet 4 mg (4 mg Oral Given 7/20/22 0845)   gadobenate dimeglumine (MULTIHANCE) injection 20 mL (20 mLs Intravenous Given 7/20/22 1439)   meperidine (PF) injection 25 mg (25 mg Intravenous Given 7/20/22 1454)     Medical Decision Making:   Initial Assessment:   Mr. Montenegro presents as transfer from outside hospital for evaluation of suspect cauda equina syndrome.  Back strain several months ago however no acute trauma.  He has intact strength on testing however diminished/absent perineal sensation, urinary retention with overflow incontinence.     Attempted to obtain a stat MRI; however, patient unable to tolerate despite analgesics, muscle relaxants, anxiolysis and reassurance.  Case discussed with neurosurgery who requested to get CT of the back with contrast to further evaluate   He'll be admitted to the medicine service with  plan for sedated MRI, inpt neurosurgery consultation.  Clinical Tests:   Lab Tests: Ordered and Reviewed  Radiological Study: Ordered and Reviewed          Scribe Attestation:   Scribe #1: I performed the above scribed service and the documentation accurately describes the services I performed. I attest to the accuracy of the note.    Attending Attestation:           Physician Attestation for Scribe:  Physician Attestation Statement for Scribe #1: I, Breanne Newby MD, reviewed documentation, as scribed by Olena Turner in my presence, and it is both accurate and complete.             ED Course as of 07/21/22 0426   Wed Jul 20, 2022   0632 I was waiting for the MRI images to come across to allow visualization and discussion with neurosurgery; however, when I contacted MRI to see if there were technical issues, I was informed that despite the analgesics and benzodiazepines given to the patient to try to help pain, muscle spasm while laying flat, he was unable tolerate the study. This was not communicated to myself or the nurse.   I have met with the patient and explained how important this study will be.   I will attempt to obtain CT w/ contrast at this time (faster study) to get a general idea of what may be causing his symptoms; however, will need MRI on this admission.  [KS]   0647 Discussed with Dr. Dubois, agrees w/ trying to get CT scan now and then sedated MRI as soon as possible. Will admit to medicine to try to get this work up completed ASAP    Stable at this time. Will need IV abx but will hold off pending imaging as may want bx/tissue for culture prior to initiation of abx if clinically infected.  [KS]      ED Course User Index  [KS] Breanne Newby MD             Clinical Impression:   Final diagnoses:  [R68.89] Suspected cauda equina syndrome (Primary)  [N39.0] Urinary tract infection without hematuria, site unspecified  [R33.8] Acute urinary retention          ED Disposition Condition    Admit                Breanne Newby MD  07/21/22 3025

## 2022-07-21 LAB
ALBUMIN SERPL-MCNC: 3.3 GM/DL (ref 3.5–5)
ALBUMIN/GLOB SERPL: 1.2 RATIO (ref 1.1–2)
ALP SERPL-CCNC: 59 UNIT/L (ref 40–150)
ALT SERPL-CCNC: 12 UNIT/L (ref 0–55)
AST SERPL-CCNC: 15 UNIT/L (ref 5–34)
BASOPHILS # BLD AUTO: 0.05 X10(3)/MCL (ref 0–0.2)
BASOPHILS NFR BLD AUTO: 0.7 %
BILIRUBIN DIRECT+TOT PNL SERPL-MCNC: 0.5 MG/DL
BUN SERPL-MCNC: 10.4 MG/DL (ref 8.9–20.6)
CALCIUM SERPL-MCNC: 8.7 MG/DL (ref 8.4–10.2)
CHLORIDE SERPL-SCNC: 101 MMOL/L (ref 98–107)
CO2 SERPL-SCNC: 29 MMOL/L (ref 22–29)
CREAT SERPL-MCNC: 0.91 MG/DL (ref 0.73–1.18)
EOSINOPHIL # BLD AUTO: 0.26 X10(3)/MCL (ref 0–0.9)
EOSINOPHIL NFR BLD AUTO: 3.9 %
ERYTHROCYTE [DISTWIDTH] IN BLOOD BY AUTOMATED COUNT: 13.4 % (ref 11.5–17)
GLOBULIN SER-MCNC: 2.7 GM/DL (ref 2.4–3.5)
GLUCOSE SERPL-MCNC: 142 MG/DL (ref 74–100)
GROUP & RH: NORMAL
HCT VFR BLD AUTO: 46.5 % (ref 42–52)
HGB BLD-MCNC: 14.6 GM/DL (ref 14–18)
IMM GRANULOCYTES # BLD AUTO: 0.02 X10(3)/MCL (ref 0–0.04)
IMM GRANULOCYTES NFR BLD AUTO: 0.3 %
INDIRECT COOMBS GEL: NORMAL
LYMPHOCYTES # BLD AUTO: 2.14 X10(3)/MCL (ref 0.6–4.6)
LYMPHOCYTES NFR BLD AUTO: 32.1 %
MCH RBC QN AUTO: 30.7 PG (ref 27–31)
MCHC RBC AUTO-ENTMCNC: 31.4 MG/DL (ref 33–36)
MCV RBC AUTO: 97.7 FL (ref 80–94)
MONOCYTES # BLD AUTO: 0.52 X10(3)/MCL (ref 0.1–1.3)
MONOCYTES NFR BLD AUTO: 7.8 %
NEUTROPHILS # BLD AUTO: 3.7 X10(3)/MCL (ref 2.1–9.2)
NEUTROPHILS NFR BLD AUTO: 55.2 %
NRBC BLD AUTO-RTO: 0 %
PLATELET # BLD AUTO: 245 X10(3)/MCL (ref 130–400)
PMV BLD AUTO: 10.7 FL (ref 7.4–10.4)
POTASSIUM SERPL-SCNC: 3.9 MMOL/L (ref 3.5–5.1)
PROT SERPL-MCNC: 6 GM/DL (ref 6.4–8.3)
RBC # BLD AUTO: 4.76 X10(6)/MCL (ref 4.7–6.1)
SODIUM SERPL-SCNC: 138 MMOL/L (ref 136–145)
WBC # SPEC AUTO: 6.7 X10(3)/MCL (ref 4.5–11.5)

## 2022-07-21 PROCEDURE — 94761 N-INVAS EAR/PLS OXIMETRY MLT: CPT

## 2022-07-21 PROCEDURE — 25000003 PHARM REV CODE 250: Performed by: STUDENT IN AN ORGANIZED HEALTH CARE EDUCATION/TRAINING PROGRAM

## 2022-07-21 PROCEDURE — 63600175 PHARM REV CODE 636 W HCPCS: Performed by: HOSPITALIST

## 2022-07-21 PROCEDURE — 63600175 PHARM REV CODE 636 W HCPCS: Performed by: STUDENT IN AN ORGANIZED HEALTH CARE EDUCATION/TRAINING PROGRAM

## 2022-07-21 PROCEDURE — 11000001 HC ACUTE MED/SURG PRIVATE ROOM

## 2022-07-21 PROCEDURE — 86901 BLOOD TYPING SEROLOGIC RH(D): CPT | Performed by: NEUROLOGICAL SURGERY

## 2022-07-21 PROCEDURE — 25000003 PHARM REV CODE 250: Performed by: NURSE PRACTITIONER

## 2022-07-21 PROCEDURE — 85025 COMPLETE CBC W/AUTO DIFF WBC: CPT | Performed by: NURSE PRACTITIONER

## 2022-07-21 PROCEDURE — 80053 COMPREHEN METABOLIC PANEL: CPT | Performed by: NURSE PRACTITIONER

## 2022-07-21 PROCEDURE — 36415 COLL VENOUS BLD VENIPUNCTURE: CPT | Performed by: NURSE PRACTITIONER

## 2022-07-21 PROCEDURE — S4991 NICOTINE PATCH NONLEGEND: HCPCS | Performed by: NURSE PRACTITIONER

## 2022-07-21 PROCEDURE — 63600175 PHARM REV CODE 636 W HCPCS: Performed by: NURSE PRACTITIONER

## 2022-07-21 RX ORDER — HYDRALAZINE HYDROCHLORIDE 20 MG/ML
10 INJECTION INTRAMUSCULAR; INTRAVENOUS
Status: DISCONTINUED | OUTPATIENT
Start: 2022-07-21 | End: 2022-07-26 | Stop reason: HOSPADM

## 2022-07-21 RX ORDER — OXYCODONE AND ACETAMINOPHEN 10; 325 MG/1; MG/1
1 TABLET ORAL EVERY 4 HOURS PRN
Status: DISCONTINUED | OUTPATIENT
Start: 2022-07-21 | End: 2022-07-26 | Stop reason: HOSPADM

## 2022-07-21 RX ORDER — HYDROMORPHONE HYDROCHLORIDE 2 MG/ML
1 INJECTION, SOLUTION INTRAMUSCULAR; INTRAVENOUS; SUBCUTANEOUS ONCE
Status: COMPLETED | OUTPATIENT
Start: 2022-07-21 | End: 2022-07-21

## 2022-07-21 RX ORDER — HYDROMORPHONE HYDROCHLORIDE 2 MG/ML
2 INJECTION, SOLUTION INTRAMUSCULAR; INTRAVENOUS; SUBCUTANEOUS EVERY 4 HOURS PRN
Status: DISCONTINUED | OUTPATIENT
Start: 2022-07-21 | End: 2022-07-26 | Stop reason: HOSPADM

## 2022-07-21 RX ADMIN — GABAPENTIN 200 MG: 100 CAPSULE ORAL at 04:07

## 2022-07-21 RX ADMIN — MELATONIN TAB 3 MG 6 MG: 3 TAB at 01:07

## 2022-07-21 RX ADMIN — GABAPENTIN 200 MG: 100 CAPSULE ORAL at 10:07

## 2022-07-21 RX ADMIN — HYDROMORPHONE HYDROCHLORIDE 1 MG: 2 INJECTION, SOLUTION INTRAMUSCULAR; INTRAVENOUS; SUBCUTANEOUS at 05:07

## 2022-07-21 RX ADMIN — HYDROMORPHONE HYDROCHLORIDE 2 MG: 2 INJECTION, SOLUTION INTRAMUSCULAR; INTRAVENOUS; SUBCUTANEOUS at 09:07

## 2022-07-21 RX ADMIN — HYDROMORPHONE HYDROCHLORIDE 0.5 MG: 2 INJECTION, SOLUTION INTRAMUSCULAR; INTRAVENOUS; SUBCUTANEOUS at 12:07

## 2022-07-21 RX ADMIN — HYDROMORPHONE HYDROCHLORIDE 0.5 MG: 2 INJECTION, SOLUTION INTRAMUSCULAR; INTRAVENOUS; SUBCUTANEOUS at 04:07

## 2022-07-21 RX ADMIN — CEFTRIAXONE SODIUM 1 G: 1 INJECTION, POWDER, FOR SOLUTION INTRAMUSCULAR; INTRAVENOUS at 04:07

## 2022-07-21 RX ADMIN — HYDROMORPHONE HYDROCHLORIDE 2 MG: 2 INJECTION, SOLUTION INTRAMUSCULAR; INTRAVENOUS; SUBCUTANEOUS at 04:07

## 2022-07-21 RX ADMIN — NICOTINE 1 PATCH: 21 PATCH, EXTENDED RELEASE TRANSDERMAL at 08:07

## 2022-07-21 RX ADMIN — HYDROMORPHONE HYDROCHLORIDE 2 MG: 2 INJECTION, SOLUTION INTRAMUSCULAR; INTRAVENOUS; SUBCUTANEOUS at 12:07

## 2022-07-21 RX ADMIN — GABAPENTIN 200 MG: 100 CAPSULE ORAL at 08:07

## 2022-07-21 RX ADMIN — HYDROMORPHONE HYDROCHLORIDE 2 MG: 2 INJECTION, SOLUTION INTRAMUSCULAR; INTRAVENOUS; SUBCUTANEOUS at 08:07

## 2022-07-21 NOTE — CONSULTS
[facility]                       [address] [id number]    PATIENT NAME:       AQUILINO MONTENEGRO   YOB: 1975  CSN:                [id number]   MRN:                [id number]  ADMIT DATE:         07/20/2022 03:21:00  PHYSICIAN:          Gypsy Dubois MD                            CONSULTATION    DATE OF CONSULT:      Thank you, Dr. Reed Aleman.      Drake Montenegro was a gentleman I have seen from the office with severe herniation at   L4-5, difficulty walking and ambulating.  I worked him up with an MRI, showing   severe pressure on the nerve root.  I offered right L4-5 MIS diskectomy.  We   discussed how it is done.  Consent was obtained .  I spent some time with him.  He   knows me from the office.  I will proceed for surgery.  He is going to be n.p.o.   after midnight.  I spent some time going over all the consents with him as   well.  Again, he wants me to proceed with surgery.        ______________________________  Gypsy Dubois MD    IM/AQS  DD:  07/21/2022  Time:  06:31AM  DT:  07/21/2022  Time:  06:43AM  Job #:  [id number]      CONSULTATION

## 2022-07-21 NOTE — PT/OT/SLP PROGRESS
Physical Therapy      Patient Name:  Pablo Montenegro   MRN:  [id number]    Patient not seen today secondary to MD hold (Comment) (pending neurosurgery tomorrow.). Will follow-up as appropriate.

## 2022-07-21 NOTE — CONSULTS
Patient was not seen today, this is a brief note to link with the consult order.    -Discussed case with neurosurgery NP, planning for surgery with Dr. Dubois tomorrow   -Will hold off on epidural injection for now  -If for some reason the patient does not undergo surgery, please contact interventional neurology (Dr. Avila) for epidural injection.    Thank you

## 2022-07-21 NOTE — PT/OT/SLP PROGRESS
Occupational Therapy      Patient Name:  Pablo Montenegro   MRN:  [id number]    Patient not seen today secondary to MD hold (Comment). Hold due to pt scheduled for neurosx R L4-L5 diskectomy tomorrow morning. Will follow up after surgery with neurosx recs    7/21/2022

## 2022-07-22 ENCOUNTER — ANESTHESIA EVENT (OUTPATIENT)
Dept: SURGERY | Facility: HOSPITAL | Age: 47
DRG: 030 | End: 2022-07-22
Payer: MEDICAID

## 2022-07-22 ENCOUNTER — ANESTHESIA (OUTPATIENT)
Dept: SURGERY | Facility: HOSPITAL | Age: 47
DRG: 030 | End: 2022-07-22
Payer: MEDICAID

## 2022-07-22 LAB — BACTERIA UR CULT: ABNORMAL

## 2022-07-22 PROCEDURE — 25000003 PHARM REV CODE 250: Performed by: NURSE ANESTHETIST, CERTIFIED REGISTERED

## 2022-07-22 PROCEDURE — 99900035 HC TECH TIME PER 15 MIN (STAT)

## 2022-07-22 PROCEDURE — 63600175 PHARM REV CODE 636 W HCPCS: Performed by: NURSE PRACTITIONER

## 2022-07-22 PROCEDURE — 63600175 PHARM REV CODE 636 W HCPCS

## 2022-07-22 PROCEDURE — 94799 UNLISTED PULMONARY SVC/PX: CPT

## 2022-07-22 PROCEDURE — 25000003 PHARM REV CODE 250: Performed by: NEUROLOGICAL SURGERY

## 2022-07-22 PROCEDURE — 27201423 OPTIME MED/SURG SUP & DEVICES STERILE SUPPLY: Performed by: NEUROLOGICAL SURGERY

## 2022-07-22 PROCEDURE — 25000003 PHARM REV CODE 250: Performed by: STUDENT IN AN ORGANIZED HEALTH CARE EDUCATION/TRAINING PROGRAM

## 2022-07-22 PROCEDURE — 36000711: Performed by: NEUROLOGICAL SURGERY

## 2022-07-22 PROCEDURE — 63600175 PHARM REV CODE 636 W HCPCS: Performed by: STUDENT IN AN ORGANIZED HEALTH CARE EDUCATION/TRAINING PROGRAM

## 2022-07-22 PROCEDURE — 25000003 PHARM REV CODE 250: Performed by: NURSE PRACTITIONER

## 2022-07-22 PROCEDURE — 71000033 HC RECOVERY, INTIAL HOUR: Performed by: NEUROLOGICAL SURGERY

## 2022-07-22 PROCEDURE — 63600175 PHARM REV CODE 636 W HCPCS: Performed by: NURSE ANESTHETIST, CERTIFIED REGISTERED

## 2022-07-22 PROCEDURE — 63600175 PHARM REV CODE 636 W HCPCS: Performed by: NEUROLOGICAL SURGERY

## 2022-07-22 PROCEDURE — S4991 NICOTINE PATCH NONLEGEND: HCPCS | Performed by: NURSE PRACTITIONER

## 2022-07-22 PROCEDURE — 25000003 PHARM REV CODE 250: Performed by: HOSPITALIST

## 2022-07-22 PROCEDURE — 63600175 PHARM REV CODE 636 W HCPCS: Performed by: ANESTHESIOLOGY

## 2022-07-22 PROCEDURE — 36000710: Performed by: NEUROLOGICAL SURGERY

## 2022-07-22 PROCEDURE — 63600175 PHARM REV CODE 636 W HCPCS: Performed by: HOSPITALIST

## 2022-07-22 PROCEDURE — 11000001 HC ACUTE MED/SURG PRIVATE ROOM

## 2022-07-22 PROCEDURE — 37000009 HC ANESTHESIA EA ADD 15 MINS: Performed by: NEUROLOGICAL SURGERY

## 2022-07-22 PROCEDURE — 37000008 HC ANESTHESIA 1ST 15 MINUTES: Performed by: NEUROLOGICAL SURGERY

## 2022-07-22 RX ORDER — ONDANSETRON 2 MG/ML
4 INJECTION INTRAMUSCULAR; INTRAVENOUS DAILY PRN
Status: DISCONTINUED | OUTPATIENT
Start: 2022-07-22 | End: 2022-07-22

## 2022-07-22 RX ORDER — ROCURONIUM BROMIDE 10 MG/ML
INJECTION, SOLUTION INTRAVENOUS
Status: DISCONTINUED | OUTPATIENT
Start: 2022-07-22 | End: 2022-07-22

## 2022-07-22 RX ORDER — HYDROCODONE BITARTRATE AND ACETAMINOPHEN 5; 325 MG/1; MG/1
1 TABLET ORAL EVERY 4 HOURS PRN
Status: DISCONTINUED | OUTPATIENT
Start: 2022-07-22 | End: 2022-07-26 | Stop reason: HOSPADM

## 2022-07-22 RX ORDER — HYDROCODONE BITARTRATE AND ACETAMINOPHEN 10; 325 MG/1; MG/1
1 TABLET ORAL EVERY 4 HOURS PRN
Status: DISCONTINUED | OUTPATIENT
Start: 2022-07-22 | End: 2022-07-26 | Stop reason: HOSPADM

## 2022-07-22 RX ORDER — ACETAMINOPHEN 10 MG/ML
1000 INJECTION, SOLUTION INTRAVENOUS ONCE
Status: DISCONTINUED | OUTPATIENT
Start: 2022-07-22 | End: 2022-07-22

## 2022-07-22 RX ORDER — HYDROMORPHONE HYDROCHLORIDE 2 MG/ML
0.4 INJECTION, SOLUTION INTRAMUSCULAR; INTRAVENOUS; SUBCUTANEOUS EVERY 5 MIN PRN
Status: DISCONTINUED | OUTPATIENT
Start: 2022-07-22 | End: 2022-07-22

## 2022-07-22 RX ORDER — GLYCOPYRROLATE 0.2 MG/ML
INJECTION INTRAMUSCULAR; INTRAVENOUS
Status: DISCONTINUED | OUTPATIENT
Start: 2022-07-22 | End: 2022-07-22

## 2022-07-22 RX ORDER — CEFAZOLIN SODIUM 1 G/3ML
INJECTION, POWDER, FOR SOLUTION INTRAMUSCULAR; INTRAVENOUS
Status: DISCONTINUED | OUTPATIENT
Start: 2022-07-22 | End: 2022-07-22

## 2022-07-22 RX ORDER — MAG HYDROX/ALUMINUM HYD/SIMETH 200-200-20
30 SUSPENSION, ORAL (FINAL DOSE FORM) ORAL EVERY 4 HOURS PRN
Status: DISCONTINUED | OUTPATIENT
Start: 2022-07-22 | End: 2022-07-26 | Stop reason: HOSPADM

## 2022-07-22 RX ORDER — FENTANYL CITRATE 50 UG/ML
INJECTION, SOLUTION INTRAMUSCULAR; INTRAVENOUS
Status: DISCONTINUED | OUTPATIENT
Start: 2022-07-22 | End: 2022-07-22

## 2022-07-22 RX ORDER — MIDAZOLAM HYDROCHLORIDE 1 MG/ML
2 INJECTION INTRAMUSCULAR; INTRAVENOUS ONCE AS NEEDED
Status: CANCELLED | OUTPATIENT
Start: 2022-07-22 | End: 2033-12-17

## 2022-07-22 RX ORDER — ONDANSETRON 2 MG/ML
INJECTION INTRAMUSCULAR; INTRAVENOUS
Status: DISCONTINUED | OUTPATIENT
Start: 2022-07-22 | End: 2022-07-22

## 2022-07-22 RX ORDER — METHOCARBAMOL 750 MG/1
750 TABLET, FILM COATED ORAL 3 TIMES DAILY
Status: DISCONTINUED | OUTPATIENT
Start: 2022-07-22 | End: 2022-07-26 | Stop reason: HOSPADM

## 2022-07-22 RX ORDER — MORPHINE SULFATE 10 MG/ML
2 INJECTION INTRAMUSCULAR; INTRAVENOUS; SUBCUTANEOUS
Status: DISCONTINUED | OUTPATIENT
Start: 2022-07-22 | End: 2022-07-26 | Stop reason: HOSPADM

## 2022-07-22 RX ORDER — BISACODYL 10 MG
10 SUPPOSITORY, RECTAL RECTAL DAILY
Status: DISCONTINUED | OUTPATIENT
Start: 2022-07-22 | End: 2022-07-26 | Stop reason: HOSPADM

## 2022-07-22 RX ORDER — DIPHENHYDRAMINE HYDROCHLORIDE 50 MG/ML
25 INJECTION INTRAMUSCULAR; INTRAVENOUS EVERY 6 HOURS PRN
Status: DISCONTINUED | OUTPATIENT
Start: 2022-07-22 | End: 2022-07-22

## 2022-07-22 RX ORDER — LIDOCAINE HYDROCHLORIDE 10 MG/ML
1 INJECTION, SOLUTION EPIDURAL; INFILTRATION; INTRACAUDAL; PERINEURAL ONCE
Status: CANCELLED | OUTPATIENT
Start: 2022-07-22 | End: 2022-07-22

## 2022-07-22 RX ORDER — CEFAZOLIN SODIUM 2 G/50ML
2 SOLUTION INTRAVENOUS
Status: DISCONTINUED | OUTPATIENT
Start: 2022-07-22 | End: 2022-07-22

## 2022-07-22 RX ORDER — METHOCARBAMOL 100 MG/ML
1000 INJECTION, SOLUTION INTRAMUSCULAR; INTRAVENOUS ONCE
Status: COMPLETED | OUTPATIENT
Start: 2022-07-22 | End: 2022-07-22

## 2022-07-22 RX ORDER — CALCIUM CARBONATE 200(500)MG
500 TABLET,CHEWABLE ORAL DAILY PRN
Status: DISCONTINUED | OUTPATIENT
Start: 2022-07-22 | End: 2022-07-26 | Stop reason: HOSPADM

## 2022-07-22 RX ORDER — PROPOFOL 10 MG/ML
VIAL (ML) INTRAVENOUS
Status: DISCONTINUED | OUTPATIENT
Start: 2022-07-22 | End: 2022-07-22

## 2022-07-22 RX ORDER — MORPHINE SULFATE 10 MG/ML
4 INJECTION INTRAMUSCULAR; INTRAVENOUS; SUBCUTANEOUS
Status: DISCONTINUED | OUTPATIENT
Start: 2022-07-22 | End: 2022-07-26 | Stop reason: HOSPADM

## 2022-07-22 RX ORDER — SODIUM CHLORIDE 9 MG/ML
INJECTION, SOLUTION INTRAVENOUS CONTINUOUS
Status: DISCONTINUED | OUTPATIENT
Start: 2022-07-22 | End: 2022-07-26 | Stop reason: HOSPADM

## 2022-07-22 RX ORDER — MIDAZOLAM HYDROCHLORIDE 1 MG/ML
INJECTION INTRAMUSCULAR; INTRAVENOUS
Status: DISCONTINUED | OUTPATIENT
Start: 2022-07-22 | End: 2022-07-22

## 2022-07-22 RX ORDER — SODIUM CHLORIDE, SODIUM GLUCONATE, SODIUM ACETATE, POTASSIUM CHLORIDE AND MAGNESIUM CHLORIDE 30; 37; 368; 526; 502 MG/100ML; MG/100ML; MG/100ML; MG/100ML; MG/100ML
1000 INJECTION, SOLUTION INTRAVENOUS CONTINUOUS
Status: CANCELLED | OUTPATIENT
Start: 2022-07-22 | End: 2022-08-21

## 2022-07-22 RX ORDER — LIDOCAINE HYDROCHLORIDE AND EPINEPHRINE 20; 10 MG/ML; UG/ML
INJECTION, SOLUTION INFILTRATION; PERINEURAL
Status: DISCONTINUED | OUTPATIENT
Start: 2022-07-22 | End: 2022-07-22 | Stop reason: HOSPADM

## 2022-07-22 RX ORDER — AMOXICILLIN 250 MG
2 CAPSULE ORAL 2 TIMES DAILY
Status: DISCONTINUED | OUTPATIENT
Start: 2022-07-22 | End: 2022-07-26 | Stop reason: HOSPADM

## 2022-07-22 RX ORDER — METHOCARBAMOL 100 MG/ML
INJECTION, SOLUTION INTRAMUSCULAR; INTRAVENOUS
Status: COMPLETED
Start: 2022-07-22 | End: 2022-07-22

## 2022-07-22 RX ORDER — PROCHLORPERAZINE EDISYLATE 5 MG/ML
10 INJECTION INTRAMUSCULAR; INTRAVENOUS EVERY 6 HOURS PRN
Status: DISCONTINUED | OUTPATIENT
Start: 2022-07-22 | End: 2022-07-26 | Stop reason: HOSPADM

## 2022-07-22 RX ORDER — DEXAMETHASONE SODIUM PHOSPHATE 4 MG/ML
INJECTION, SOLUTION INTRA-ARTICULAR; INTRALESIONAL; INTRAMUSCULAR; INTRAVENOUS; SOFT TISSUE
Status: DISCONTINUED | OUTPATIENT
Start: 2022-07-22 | End: 2022-07-22

## 2022-07-22 RX ADMIN — CEFAZOLIN 2 G: 330 INJECTION, POWDER, FOR SOLUTION INTRAMUSCULAR; INTRAVENOUS at 07:07

## 2022-07-22 RX ADMIN — HYDROMORPHONE HYDROCHLORIDE 0.4 MG: 2 INJECTION, SOLUTION INTRAMUSCULAR; INTRAVENOUS; SUBCUTANEOUS at 08:07

## 2022-07-22 RX ADMIN — MELATONIN TAB 3 MG 6 MG: 3 TAB at 11:07

## 2022-07-22 RX ADMIN — DEXAMETHASONE SODIUM PHOSPHATE 4 MG: 4 INJECTION, SOLUTION INTRA-ARTICULAR; INTRALESIONAL; INTRAMUSCULAR; INTRAVENOUS; SOFT TISSUE at 07:07

## 2022-07-22 RX ADMIN — ACETAMINOPHEN 325MG 650 MG: 325 TABLET ORAL at 08:07

## 2022-07-22 RX ADMIN — GLYCOPYRROLATE 0.2 MG: 0.2 INJECTION INTRAMUSCULAR; INTRAVENOUS at 06:07

## 2022-07-22 RX ADMIN — PROPOFOL 200 MG: 10 INJECTION, EMULSION INTRAVENOUS at 06:07

## 2022-07-22 RX ADMIN — HYDROMORPHONE HYDROCHLORIDE 2 MG: 2 INJECTION, SOLUTION INTRAMUSCULAR; INTRAVENOUS; SUBCUTANEOUS at 01:07

## 2022-07-22 RX ADMIN — CEFTRIAXONE SODIUM 1 G: 1 INJECTION, POWDER, FOR SOLUTION INTRAMUSCULAR; INTRAVENOUS at 05:07

## 2022-07-22 RX ADMIN — GABAPENTIN 200 MG: 100 CAPSULE ORAL at 09:07

## 2022-07-22 RX ADMIN — FENTANYL CITRATE 100 MCG: 50 INJECTION, SOLUTION INTRAMUSCULAR; INTRAVENOUS at 06:07

## 2022-07-22 RX ADMIN — HYDROMORPHONE HYDROCHLORIDE 2 MG: 2 INJECTION, SOLUTION INTRAMUSCULAR; INTRAVENOUS; SUBCUTANEOUS at 05:07

## 2022-07-22 RX ADMIN — OXYCODONE AND ACETAMINOPHEN 1 TABLET: 10; 325 TABLET ORAL at 11:07

## 2022-07-22 RX ADMIN — METHOCARBAMOL 1000 MG: 100 INJECTION, SOLUTION INTRAMUSCULAR; INTRAVENOUS at 08:07

## 2022-07-22 RX ADMIN — ACETAMINOPHEN 325MG 650 MG: 325 TABLET ORAL at 03:07

## 2022-07-22 RX ADMIN — METHOCARBAMOL 750 MG: 750 TABLET ORAL at 03:07

## 2022-07-22 RX ADMIN — SUGAMMADEX 200 MG: 100 INJECTION, SOLUTION INTRAVENOUS at 07:07

## 2022-07-22 RX ADMIN — HYDRALAZINE HYDROCHLORIDE 10 MG: 20 INJECTION INTRAMUSCULAR; INTRAVENOUS at 08:07

## 2022-07-22 RX ADMIN — HYDROMORPHONE HYDROCHLORIDE 2 MG: 2 INJECTION, SOLUTION INTRAMUSCULAR; INTRAVENOUS; SUBCUTANEOUS at 09:07

## 2022-07-22 RX ADMIN — ONDANSETRON 4 MG: 2 INJECTION INTRAMUSCULAR; INTRAVENOUS at 07:07

## 2022-07-22 RX ADMIN — SENNOSIDES AND DOCUSATE SODIUM 2 TABLET: 50; 8.6 TABLET ORAL at 09:07

## 2022-07-22 RX ADMIN — SENNOSIDES AND DOCUSATE SODIUM 2 TABLET: 50; 8.6 TABLET ORAL at 08:07

## 2022-07-22 RX ADMIN — GABAPENTIN 200 MG: 100 CAPSULE ORAL at 08:07

## 2022-07-22 RX ADMIN — GABAPENTIN 200 MG: 100 CAPSULE ORAL at 03:07

## 2022-07-22 RX ADMIN — NICOTINE 1 PATCH: 21 PATCH, EXTENDED RELEASE TRANSDERMAL at 09:07

## 2022-07-22 RX ADMIN — SODIUM CHLORIDE, SODIUM GLUCONATE, SODIUM ACETATE, POTASSIUM CHLORIDE AND MAGNESIUM CHLORIDE: 526; 502; 368; 37; 30 INJECTION, SOLUTION INTRAVENOUS at 06:07

## 2022-07-22 RX ADMIN — ROCURONIUM BROMIDE 30 MG: 10 SOLUTION INTRAVENOUS at 06:07

## 2022-07-22 RX ADMIN — METHOCARBAMOL 750 MG: 750 TABLET ORAL at 09:07

## 2022-07-22 RX ADMIN — METHOCARBAMOL 750 MG: 750 TABLET ORAL at 08:07

## 2022-07-22 RX ADMIN — MIDAZOLAM 2 MG: 1 INJECTION INTRAMUSCULAR; INTRAVENOUS at 06:07

## 2022-07-22 NOTE — PLAN OF CARE
Problem: Infection  Goal: Absence of Infection Signs and Symptoms  Outcome: Ongoing, Progressing     Problem: Adult Inpatient Plan of Care  Goal: Plan of Care Review  Outcome: Ongoing, Progressing  Goal: Patient-Specific Goal (Individualized)  Outcome: Ongoing, Progressing  Goal: Absence of Hospital-Acquired Illness or Injury  Outcome: Ongoing, Progressing  Goal: Optimal Comfort and Wellbeing  Outcome: Ongoing, Progressing  Goal: Readiness for Transition of Care  Outcome: Ongoing, Progressing     Problem: Skin Injury Risk Increased  Goal: Skin Health and Integrity  Outcome: Ongoing, Progressing

## 2022-07-22 NOTE — PROGRESS NOTES
"[facility]  Hospital Medicine Progress Note        Chief Complaint: Inpatient Follow-up for cauda equina syndrome     HPI:   47 y.o. male who  has a past medical history of Addiction to drug, Anxiety, Depression, Herniated thoracic disc without myelopathy, and Pancreatitis., HTN; presents to the ED at [facility] with reports of lower back pain and difficulty  Urinating.  Patient reports he has been having bowel and bladder dysfunction with retention over  The past 2 weeks.  His significant other is at the bedside (Andrae) who is providing some of the history.  It is reported patient "lifted up on an island"  and strained his lower back. IT is reported this happed about a couple of months ago.   Patient reports the symptoms of lower back pain progressed and got worse, along with urinary retention and no bowel movement over the past 2 weeks.  Patient tried using a suppository to facilitate a bowel movement however he reports he could not feel the suppository and it produced no relief. Pt also reports urinary incontinence as well with needing wear diapers.  PT presented to the ED at [facility]. Work up there revealed greater than 800 ml in patient bladder post ovid residual. Pt was transferred to [facility] for   Further imaging in neurosurgical evaluation.  Neurosurgery services were consulted. CT of his L spine was completed upon arrival and was significant for suspected right central disc protrusion at L4-5 with moderate narrowing of the canal and likely impingement of the descending right L5 nerve roots. MRI of the lumbar with and without contrast  demonstrated  Transitional lumbosacral anatomy with partial sacralization of L5, suspected right central disc protrusion at L4-L5 with moderate narrowing of canal and likely impingement of descending right L5 nerve root; moderate to severe neural foraminal narrowing on the right at L4-5 moderate on the left.   Patient reports no chest pain, " shortness a breath, fever, chills, cough, congestion, any sick contacts.  Patient is admitted to hospital medicine services for further management.  Neurosurgery services consult.     Interval Hx:  NPO for surgery this morning.  No complaints outside of pain.  Ready for surgery.     Objective/physical exam:  General: In no acute distress, afebrile  Chest: Clear to auscultation bilaterally  Heart: RRR, +S1, S2, no appreciable murmur  Abdomen: Soft, nontender, BS +  MSK: Warm, no lower extremity edema, no clubbing or cyanosis  Neurologic: Alert and oriented x4, Cranial nerve II-XII intact, Strength 5/5 in all 4 extremities    VITAL SIGNS: 24 HRS MIN & MAX LAST   Temp  Min: 97.6 °F (36.4 °C)  Max: 99 °F (37.2 °C) 97.6 °F (36.4 °C)   BP  Min: 123/84  Max: 161/102 (!) 144/96   Pulse  Min: 64  Max: 75  64   Resp  Min: 16  Max: 18 18   SpO2  Min: 95 %  Max: 98 % 98 %       Recent Labs   Lab 07/20/22  0212 07/21/22  0520   WBC 19.5* 6.7   RBC 4.75 4.76   HGB 14.6 14.6   HCT 45.8 46.5   MCV 96.4* 97.7*   MCH 30.7 30.7   MCHC 31.9* 31.4*   RDW 13.3 13.4    245   MPV 10.4 10.7*       Recent Labs   Lab 07/20/22  0212 07/21/22  0520    138   K 3.7 3.9   CO2 31* 29   BUN 17.3 10.4   CREATININE 1.03 0.91   CALCIUM 9.6 8.7   ALBUMIN 3.7 3.3*   ALKPHOS 68 59   ALT 16 12   AST 15 15   BILITOT 0.7 0.5          Microbiology Results (last 7 days)     Procedure Component Value Units Date/Time    Blood Culture (site 1) [[id number]]  (Normal) Collected: 07/20/22 0931    Order Status: Completed Specimen: Blood Updated: 07/21/22 1104     CULTURE, BLOOD ([facility]) No Growth At 24 Hours    Blood Culture (site 2) [[id number]]  (Normal) Collected: 07/20/22 0931    Order Status: Completed Specimen: Blood Updated: 07/21/22 1104     CULTURE, BLOOD ([facility]) No Growth At 24 Hours    Urine culture [[id number]]  (Abnormal) Collected: 07/20/22 0342    Order Status: Completed Specimen: Urine, Catheterized Updated: 07/21/22 0701     Urine Culture >/=  100,000 colonies/ml Gram-negative Rods           See below for Radiology    Scheduled Med:   cefTRIAXone (ROCEPHIN) IVPB  1 g Intravenous Q24H    gabapentin  200 mg Oral TID    nicotine  1 patch Transdermal Daily        Continuous Infusions:       PRN Meds:  acetaminophen, ceFAZolin (ANCEF) 1 gram in sodium chloride 0.9% irrigation, dextrose 10%, dextrose 10%, glucagon (human recombinant), glucose, glucose, hydrALAZINE, HYDROmorphone, LIDOcaine 2%/EPINEPHrine 1:100,000, melatonin, naloxone, ondansetron, oxyCODONE-acetaminophen, simethicone, sodium chloride 0.9%       Assessment/Plan:   Acute cauda equina syndrome - suspected per MRI  Acute urinary retention  Acute lumbar stenosis- L4-5 with L5 root impingement  HTN- essential  Lumbar strain- per pt reports 2 week ago  Nicotine dependence- cigarettes     Hx of anxiety, depression, poly-substance abuse (denies any recent use)     Going to the OR this morning for L4-5 MIS diskectomy.  Should return to the floor postoperatively.  Will address pain control afterwards.  Continue Torres for now.  Will try some bladder training afterwards.  Four to for the plan to resume anticoagulation for DVT prophylaxis once cleared by Neurosurgery.  Continue bowel regimen    Patient condition:  Stable    Anticipated discharge and Disposition: TBD      All diagnosis and differential diagnosis have been reviewed; assessment and plan has been documented; I have personally reviewed the labs and test results that are presently available; I have reviewed the patients medication list; I have reviewed the consulting providers response and recommendations. I have reviewed or attempted to review medical records based upon their availability    All of the patient's questions have been  addressed and answered. Patient's is agreeable to the above stated plan. I will continue to monitor closely and make adjustments to medical management as  needed.  _____________________________________________________________________      Radiology:  MRI Lumbar Spine W WO Cont  Narrative: EXAMINATION:  MRI LUMBAR SPINE W WO CONTRAST    CLINICAL HISTORY:  Low back pain, cauda equina syndrome suspected;    TECHNIQUE:  Multiplanar multisequence MR images of the lumbar spine are obtained with and without contrast.    COMPARISON:  CT of the lumbar spine dated 07/20/2022    FINDINGS:  There is transitional lumbosacral anatomy with the lowest disc space labeled as L5-S1 and partial sacralization at L5. Alignment is normal without subluxation.  Vertebral body heights are maintained.  There is mild degenerate endplate edema at L4-5.    The conus terminates at the level of L1.  It is normal in signal and contour.  There is enhancement of the cauda equina nerve roots.    Disc spaces, spinal canal and neural foramina are as follows:    L1-L2: No disc herniation.  No significant spinal canal or neural foraminal stenosis.    L2-L3: Disc bulge and facet hypertrophy with mild narrowing of the spinal canal.  Mild bilateral neural foraminal stenosis.    L3-L4: Disc bulge and facet hypertrophy with mild narrowing of the spinal canal.  Mild bilateral neural foraminal stenosis.    L4-L5: A right central disc extrusion measures 9 x 12 x 17 mm in size and causes severe spinal canal stenosis with complete effacement of the CSF space and impingement on the cord equina nerve roots.  There is mild-to-moderate bilateral neural foraminal stenosis.    L5-S1: No disc herniation.  Bilateral facet hypertrophy.  No significant spinal canal or neural foraminal stenosis.    No significant abnormality within the visualized paraspinous musculature.  Impression: 1. 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  2. Large right central disc extrusion at L4-5 with severe canal stenosis.  3. Cauda equina nerve root enhancement is nonspecific and may be inflammatory.  4. Mild  degenerative narrowing of the canal at L2-L3 and L4-5.  5. Multilevel neural foraminal stenoses as described.    Electronically signed by: Margo Sigala  Date:    07/20/2022  Time:    15:01  CT Lumbar Spine With Contrast  Narrative: EXAMINATION:  CT LUMBAR SPINE WITH CONTRAST    CLINICAL HISTORY:  Low back pain, cauda equina syndrome suspected;    TECHNIQUE:  CT images of the lumbar spine with contrast.  Axial, coronal, and sagittal reformatted images were obtained. Dose length product is 622 mGycm. Automatic exposure control, adjustment of mA/kV or iterative reconstruction technique was used to limit radiation dose.    COMPARISON:  CT abdomen pelvis dated 12/27/2022    FINDINGS:  There is transitional lumbosacral anatomy with the lowest fully formed disc space labeled as L5-S1 and partial sacralization of L5.  Lumbar alignment is normal.  The vertebral body heights are maintained. There is no acute fracture identified.  There is mild disc height loss at L4-5 and L5-S1 with small multilevel marginal osteophytes.  There is mild facet arthropathy. There is mild degenerative narrowing of the canal at L2-L3 and L3-L4 with disc bulges and facet hypertrophy.  At L4-L5, there is a disc bulge with right central disc protrusion which causes moderate narrowing of the canal and likely impingement on the descending right L5 nerve roots.  There is moderate to severe narrowing of the right L4-L5 neural foramina, moderate on the left.  The paraspinal soft tissues are unremarkable.  There is no drainable fluid collection.  Impression: 1. 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  2. Suspected right central disc protrusion at L4-L5 with moderate narrowing of the canal and likely impingement on the descending right L5 nerve roots.  3. Moderate to severe neural foraminal narrowing on the right at L4-L5, moderate on the left.    Electronically signed by: Margo  Zakiya  Date:    07/20/2022  Time:    08:07      Louis Angeles MD   07/22/2022

## 2022-07-22 NOTE — TRANSFER OF CARE
"Anesthesia Transfer of Care Note    Patient: Pablo Montenegro    Procedure(s) Performed: Procedure(s) (LRB):  DISCECTOMY, SPINE, MINIMALLY INVASIVE, USING MICROSCOPE (N/A)    Anesthesia PACU Handoff    Last vitals:   Visit Vitals  /77   Pulse 81   Temp 36.6 °C (97.9 °F) (Oral)   Resp 16   Ht 5' 11" (1.803 m)   Wt 89.8 kg (198 lb)   SpO2 99%   BMI 27.62 kg/m²     "

## 2022-07-22 NOTE — PLAN OF CARE
Pt lives with his SO Olena Lagos and his children ages 10 and 13. He states prior to admit he was using crutches to assist w ambulation and his SO had to assist with bathing and do all the driving.   Pt states his PCP is Lio Patel and his correct cell phone is  [phone], I called admit registration to update the facesheet.  Pt had a laminectomy this am and has not worked with PT/OT, he states he is waiting on a back brace before he can work with therapy.  CM will eval for dc needs after PT/OT eval.  Pt's SO is present in room and states she is able to drive him home at dc.  Pt states he has a court date on 7/27/22 that he must attend.

## 2022-07-22 NOTE — PT/OT/SLP PROGRESS
Occupational Therapy      Patient Name:  Pablo Montenegro   MRN:  [id number]    Patient not seen today secondary to Other (Comment) (surgery and awaiting LSO). Pt in surgery this am; pt awaiting LSO this pm. Will follow-up tomorrow.    7/22/2022

## 2022-07-22 NOTE — PT/OT/SLP PROGRESS
Physical Therapy      Patient Name:  Pablo Montenegro   MRN:  [id number]    Patient not seen today secondary to Off the floor for procedure/surgery. Will follow-up as schedule permits.    PT follow up in PM. Patient with specific orders that LSO is required for sitting and standing. LSO not yet delivered, discussed with nursing to call  for deliver. PT to follow up once brace is delivered, as schedule permits.

## 2022-07-22 NOTE — TRANSFER OF CARE
"Anesthesia Transfer of Care Note    Patient: Pablo Montenegro    Procedure(s) Performed: Procedure(s) (LRB):  DISCECTOMY, SPINE, MINIMALLY INVASIVE, USING MICROSCOPE (N/A)    Patient location: PACU    Anesthesia Type: general    Transport from OR: Transported from OR on room air with adequate spontaneous ventilation    Post pain: adequate analgesia    Post assessment: no apparent anesthetic complications and tolerated procedure well    Post vital signs: stable    Level of consciousness: awake and alert    Nausea/Vomiting: no nausea/vomiting    Complications: none    Transfer of care protocol was followed      Last vitals:   Visit Vitals  BP (!) 144/96   Pulse 64   Temp 36.4 °C (97.6 °F) (Oral)   Resp 18   Ht 5' 11" (1.803 m)   Wt 89.8 kg (198 lb)   SpO2 98%   BMI 27.62 kg/m²     "

## 2022-07-22 NOTE — ANESTHESIA PREPROCEDURE EVALUATION
"                                                                                                             07/22/2022  Pablo Montenegro is a 47 y.o., male with back pain and disc herniation for minimally invasive discectomy.  Known hx of substance abuse.  He denies cardiopulmonary complaints.  Recently underwent general anesthesia for MRI and did well.    "Drake Montenegro was a gentleman I have seen from the office with severe herniation at   L4-5, difficulty walking and ambulating.  I worked him up with an MRI, showing   severe pressure on the nerve root.  I offered right L4-5 MIS diskectomy.  We   discussed how it is done.  Consent was obtained .  I spent some time with him.  He   knows me from the office.  I will proceed for surgery.  He is going to be n.p.o.   after midnight.  I spent some time going over all the consents with him as   well.  Again, he wants me to proceed with surgery."      Pre-op Assessment    I have reviewed the Patient Summary Reports.     I have reviewed the Nursing Notes. I have reviewed the NPO Status.   I have reviewed the Medications.     Review of Systems  Anesthesia Hx:  No problems with previous Anesthesia  Denies Family Hx of Anesthesia complications.   Denies Personal Hx of Anesthesia complications.   Cardiovascular:  Cardiovascular Normal     Pulmonary:  Pulmonary Normal    Musculoskeletal:  Spine Disorders:    Psych:   Psychiatric History          Physical Exam  General: Well nourished, Cooperative, Alert and Oriented    Airway:  Mallampati: II   Mouth Opening: Normal  TM Distance: Normal  Tongue: Normal  Neck ROM: Normal ROM    Dental:  Periodontal disease    Chest/Lungs:  Clear to auscultation, Normal Respiratory Rate    Heart:  Rate: Normal  Rhythm: Regular Rhythm        Anesthesia Plan  Type of Anesthesia, risks & benefits discussed:    Anesthesia Type: Gen ETT  Intra-op Monitoring Plan: Standard ASA Monitors  Post Op Pain Control Plan: multimodal analgesia  Induction:  IV  Airway " Plan: Direct, Post-Induction  Informed Consent: Informed consent signed with the Patient and all parties understand the risks and agree with anesthesia plan.  All questions answered.   ASA Score: 2  Day of Surgery Review of History & Physical: H&P Update referred to the surgeon/provider.    Ready For Surgery From Anesthesia Perspective.     .

## 2022-07-22 NOTE — BRIEF OP NOTE
[facility]  Brief Operative Note    SUMMARY     Surgery Date: 7/22/2022     Surgeon(s) and Role:     * Gypsy Dubois MD - Primary    Assisting Surgeon: None    Pre-op Diagnosis:  Lumbar herniation     Post-op Diagnosis:  Same    Procedure(s) (LRB):  DISCECTOMY, SPINE, MINIMALLY INVASIVE, USING MICROSCOPE (N/A)     Right L4/L5 MIS laminotomy and discectomy with C-arm    Anesthesia: General    Operative Findings: Dictated     Estimated Blood Loss: * No values recorded between 7/22/2022  7:04 AM and 7/22/2022  7:32 AM *    Estimated Blood Loss has been documented.         Specimens:   Specimen (24h ago, onward)            None          [id number]

## 2022-07-22 NOTE — OP NOTE
[facility]                       [address] [id number]    PATIENT NAME:      AQUILINO BORJAS   YOB: 1975  CSN:               [id number]  MRN:               [id number]  ADMIT DATE:        07/20/2022 03:21:00  PHYSICIAN:         Gypsy Dubois MD                          OPERATIVE REPORT      DATE OF SURGERY:    07/22/2022 00:00:00    SURGEON:  Gypsy Dubois MD    PREOPERATIVE DIAGNOSES:    1. Right L4-5 herniation.  2. Severe back pain.  3. Leg pain.    POSTOPERATIVE DIAGNOSES:    1. Right L4-5 herniation.  2. Severe back pain.  3. Leg pain.    PROCEDURE:  Right L4-5 MIS hemilaminectomy, medial facetectomy, foraminotomy,   discectomy using microdissection.  There were no issues or complications.    INDICATIONS FOR SURGERY:  The patient has  severe back pain, with herniation at L4-5,   here for MIS discectomy.  The risks, benefits, consent were discussed.  We spent   some time going over his cauda equina syndrome, leg numbness, weakness.  He   came to the hospital emergency here for discectomy.  The risks, benefits, consent   discussed in detail.    DETAILS OF OPERATIVE PROCEDURE:  Brought to the operating room, intubated,   turned prone.  Back was prepped and draped.  We made an off midline incision,   put a tube in and did hemilaminectomy, medial facetectomy, foraminotomy taking pressure   of the nerve root, thecal sac on the right side.  Once that was done, we took   our time of removing the bone and made sure that it was free.  The discectomy was done under the   microscope.  Several large fragments came out until everything was nicely free.    Hemostasis obtained with FloSeal and Avitene.  The wound was then closed in   multiple layers with 0 Vicryl, 3-0 Vicryl running subcu.  There were no issues   or complications from my standpoint.        ______________________________  MD JASON Roman/BENITOS  DD:   07/22/2022  Time:  07:40AM  DT:  07/22/2022  Time:  08:01AM  Job #:  [id number]      OPERATIVE REPORT

## 2022-07-22 NOTE — ANESTHESIA PROCEDURE NOTES
Intubation    Date/Time: 7/22/2022 6:47 AM  Performed by: Ramez Landry CRNA  Authorized by: Sesar Iraheta MD     Intubation:     Induction:  Intravenous    Intubated:  Postinduction    Mask Ventilation:  Easy mask    Attempts:  1    Attempted By:  Student    Method of Intubation:  Direct    Blade:  Mor[place] 3    Laryngeal View Grade: Grade I - full view of cords      Difficult Airway Encountered?: No      Complications:  None    Airway Device:  Oral endotracheal tube    Airway Device Size:  7.5    Style/Cuff Inflation:  Cuffed (inflated to minimal occlusive pressure)    Tube secured:  23    Secured at:  The lips    Placement Verified By:  Capnometry    Complicating Factors:  None    Findings Post-Intubation:  BS equal bilateral and atraumatic/condition of teeth unchanged

## 2022-07-22 NOTE — ANESTHESIA RELEASE NOTE
"Anesthesia Release from PACU Note    Patient: Pablo Montenegro    Procedure(s) Performed: Procedure(s) (LRB):  DISCECTOMY, SPINE, MINIMALLY INVASIVE, USING MICROSCOPE (N/A)    Anesthesia type: general    Post pain: Adequate analgesia    Post assessment: no apparent anesthetic complications    Last Vitals:   Visit Vitals  /77   Pulse 81   Temp 36.6 °C (97.9 °F) (Oral)   Resp 16   Ht 5' 11" (1.803 m)   Wt 89.8 kg (198 lb)   SpO2 99%   BMI 27.62 kg/m²       Post vital signs: stable    Level of consciousness: awake, alert  and oriented    Nausea/Vomiting: no nausea/no vomiting    Complications: none    Airway Patency: patent    Respiratory: unassisted    Cardiovascular: stable and blood pressure at baseline    Hydration: euvolemic  "

## 2022-07-23 PROCEDURE — 11000001 HC ACUTE MED/SURG PRIVATE ROOM

## 2022-07-23 PROCEDURE — 25000003 PHARM REV CODE 250: Performed by: NURSE PRACTITIONER

## 2022-07-23 PROCEDURE — 25000003 PHARM REV CODE 250: Performed by: HOSPITALIST

## 2022-07-23 PROCEDURE — 97530 THERAPEUTIC ACTIVITIES: CPT

## 2022-07-23 PROCEDURE — 25000003 PHARM REV CODE 250: Performed by: NEUROLOGICAL SURGERY

## 2022-07-23 PROCEDURE — 97161 PT EVAL LOW COMPLEX 20 MIN: CPT

## 2022-07-23 PROCEDURE — 97165 OT EVAL LOW COMPLEX 30 MIN: CPT

## 2022-07-23 PROCEDURE — 63600175 PHARM REV CODE 636 W HCPCS: Performed by: STUDENT IN AN ORGANIZED HEALTH CARE EDUCATION/TRAINING PROGRAM

## 2022-07-23 PROCEDURE — S4991 NICOTINE PATCH NONLEGEND: HCPCS | Performed by: NURSE PRACTITIONER

## 2022-07-23 PROCEDURE — 63600175 PHARM REV CODE 636 W HCPCS: Performed by: HOSPITALIST

## 2022-07-23 PROCEDURE — 25000003 PHARM REV CODE 250: Performed by: STUDENT IN AN ORGANIZED HEALTH CARE EDUCATION/TRAINING PROGRAM

## 2022-07-23 RX ADMIN — GABAPENTIN 200 MG: 100 CAPSULE ORAL at 08:07

## 2022-07-23 RX ADMIN — OXYCODONE AND ACETAMINOPHEN 1 TABLET: 10; 325 TABLET ORAL at 05:07

## 2022-07-23 RX ADMIN — METHOCARBAMOL 750 MG: 750 TABLET ORAL at 08:07

## 2022-07-23 RX ADMIN — ALUMINUM HYDROXIDE, MAGNESIUM HYDROXIDE, AND SIMETHICONE 30 ML: 200; 200; 20 SUSPENSION ORAL at 02:07

## 2022-07-23 RX ADMIN — HYDROCODONE BITARTRATE AND ACETAMINOPHEN 1 TABLET: 10; 325 TABLET ORAL at 04:07

## 2022-07-23 RX ADMIN — OXYCODONE AND ACETAMINOPHEN 1 TABLET: 10; 325 TABLET ORAL at 08:07

## 2022-07-23 RX ADMIN — HYDROMORPHONE HYDROCHLORIDE 2 MG: 2 INJECTION, SOLUTION INTRAMUSCULAR; INTRAVENOUS; SUBCUTANEOUS at 04:07

## 2022-07-23 RX ADMIN — NICOTINE 1 PATCH: 21 PATCH, EXTENDED RELEASE TRANSDERMAL at 08:07

## 2022-07-23 RX ADMIN — HYDROMORPHONE HYDROCHLORIDE 2 MG: 2 INJECTION, SOLUTION INTRAMUSCULAR; INTRAVENOUS; SUBCUTANEOUS at 06:07

## 2022-07-23 RX ADMIN — CEFTRIAXONE SODIUM 1 G: 1 INJECTION, POWDER, FOR SOLUTION INTRAMUSCULAR; INTRAVENOUS at 04:07

## 2022-07-23 RX ADMIN — METHOCARBAMOL 750 MG: 750 TABLET ORAL at 02:07

## 2022-07-23 RX ADMIN — HYDROMORPHONE HYDROCHLORIDE 2 MG: 2 INJECTION, SOLUTION INTRAMUSCULAR; INTRAVENOUS; SUBCUTANEOUS at 08:07

## 2022-07-23 RX ADMIN — SENNOSIDES AND DOCUSATE SODIUM 2 TABLET: 50; 8.6 TABLET ORAL at 08:07

## 2022-07-23 RX ADMIN — HYDROMORPHONE HYDROCHLORIDE 2 MG: 2 INJECTION, SOLUTION INTRAMUSCULAR; INTRAVENOUS; SUBCUTANEOUS at 02:07

## 2022-07-23 RX ADMIN — GABAPENTIN 200 MG: 100 CAPSULE ORAL at 02:07

## 2022-07-23 RX ADMIN — HYDROMORPHONE HYDROCHLORIDE 2 MG: 2 INJECTION, SOLUTION INTRAMUSCULAR; INTRAVENOUS; SUBCUTANEOUS at 11:07

## 2022-07-23 RX ADMIN — OXYCODONE AND ACETAMINOPHEN 1 TABLET: 10; 325 TABLET ORAL at 09:07

## 2022-07-23 RX ADMIN — CALCIUM CARBONATE (ANTACID) CHEW TAB 500 MG 500 MG: 500 CHEW TAB at 06:07

## 2022-07-23 RX ADMIN — CALCIUM CARBONATE (ANTACID) CHEW TAB 500 MG 500 MG: 500 CHEW TAB at 08:07

## 2022-07-23 RX ADMIN — OXYCODONE AND ACETAMINOPHEN 1 TABLET: 10; 325 TABLET ORAL at 02:07

## 2022-07-23 NOTE — PROGRESS NOTES
POD#1 right L4-5 MIS with discectomy  He is sitting up in bed, NAD  He does have mild pain at the incision site  He states his leg pain has resolved  He feels like his bladder sensation has improved as well    HUSSAIN Doan well, no deficits appreciated  Rodriguez in place  Incision c/d/i    Plan: We will attempt to wean rodriguez  Continue current dressing until tomorrow  PT/OT  SCDs for DVT prophylaxis

## 2022-07-23 NOTE — PLAN OF CARE
Problem: Infection  Goal: Absence of Infection Signs and Symptoms  Outcome: Ongoing, Progressing     Problem: Adult Inpatient Plan of Care  Goal: Patient-Specific Goal (Individualized)  Outcome: Ongoing, Progressing  Goal: Absence of Hospital-Acquired Illness or Injury  Outcome: Ongoing, Progressing  Goal: Readiness for Transition of Care  Outcome: Ongoing, Progressing     Problem: Skin Injury Risk Increased  Goal: Skin Health and Integrity  Outcome: Ongoing, Progressing     Problem: Fall Injury Risk  Goal: Absence of Fall and Fall-Related Injury  Outcome: Ongoing, Progressing

## 2022-07-23 NOTE — PLAN OF CARE
Problem: Physical Therapy  Goal: Physical Therapy Goal  Description: Short Term Goals:  1. Mod I for bed mobility  2. Sit<>stand at RW with Mod I   3. Stand-pivot transfer at RW with Mod I  4. Pt to ambulate 400' at RW with Mod I  5. Pt to ambulate 6 steps with use of bilateral handrails and SBA.  Outcome: Ongoing, Progressing

## 2022-07-23 NOTE — PT/OT/SLP EVAL
Physical Therapy Evaluation    Patient Name:  Pablo Montenegro   MRN:  [id number]    Recommendations:     Discharge Recommendations:  home health PT, outpatient PT   Discharge Equipment Recommendations: walker, rolling, other (see comments) (shower chair vs tub bench)     Assessment:     Pablo Montenegro is a 47 y.o. male admitted with a medical diagnosis of Suspected cauda equina syndrome. Pt s/p L4-L5 diskectomy (7/22/22). He presents with the following impairments/functional limitations:  weakness, impaired endurance, impaired sensation, impaired functional mobility, gait instability, impaired balance, decreased lower extremity function, decreased safety awareness, pain, orthopedic precautions.    Rehab Prognosis: Good; patient would benefit from acute skilled PT services to address these deficits and reach maximum level of function.    Recent Surgery: Procedure(s) (LRB):  DISCECTOMY, SPINE, MINIMALLY INVASIVE, USING MICROSCOPE (N/A) 1 Day Post-Op    Plan:     During this hospitalization, patient to be seen BID to address the identified rehab impairments via gait training, therapeutic activities, therapeutic exercises, neuromuscular re-education and progress toward the following goals:    · Plan of Care Expires:  08/22/22    Subjective     Chief Complaint: back pain and numbness in bilateral calves and lateral foot  Patient/Family Comments/goals: Improve independence with all mobility   Pain/Comfort:  · Pain Rating 1: 8/10  · Location 1: back  · Pain Addressed 1: Reposition, Distraction    Living Environment:  Pt reports that he lives at home with significant other, was independent without use of AD prior, but was requiring use of axillary crutches for ~1 month. Pt reports 6 steps with bilateral handrails to enter home.   Prior to admission, patients level of function was independent.  Equipment used at home: other (see comments) (Pt reports no AD needed prior, but requiring use of axillary crutches more recently).      Objective:     Communicated with nurse Rosas prior to session.  Patient found HOB elevated with SCD, rodriguez catheter  upon PT entry to room.    General Precautions: Standard, fall   Orthopedic Precautions:spinal precautions   Braces: LSO  Respiratory Status: Room air    Exams:  · Sensation:    · -       Impaired  at region of bilateral calves and lateral foot only  · RUE Strength: WFL  · LUE Strength: WFL  · RLE ROM: WFL  · RLE Strength: WFL; at least 3/5 all, no resistance given 2/2 sx  · LLE ROM: WFL  · LLE Strength: WFL; at least 3/5 all, no resistance given 2/2 sx    Functional Mobility:  · Bed Mobility:     · Scooting: supervision  · Supine to Sit: supervision  · Sit to Supine: did not occur. Pt left sitting up in chair at end of session.  · Transfers:     · Sit to Stand:  contact guard assistance with rolling walker and x 2 reps   · Bed to Chair: contact guard assistance with  rolling walker  using  Step Transfer  · Toilet Transfer: minimum assistance with  rolling walker  for increased safety and VCs  · Gait: Pt ambulated 200' at RW at sedate pace with CGA/Min A for balance, L knee occasionally slightly gives, and slight hip drop. VCs requiring for increased safety and slower pace for safety.    Therapeutic Activities and Exercises:   Increased time required for pt education, LE exercise, multiple UE reaches/activities sitting EOB with SBA/supervision for balance, and practice of transfers including sit<>stand/bed<>chair/toilet transfers. Pt educated on spinal precautions, proper donning/doffing of back brace, and importance of wearing brace any time pt is not lying in bed or is lying with HOB greater than 30 deg. Pt verbalized understanding of all and donned brace independently.        Patient left up in chair with all lines intact and call button in reach. Pt instructed to call nurse when ready to return to bed and to not attempt to get up on own at this time. Pt verbalized understanding of all.      GOALS:   Multidisciplinary Problems     Physical Therapy Goals        Problem: Physical Therapy    Goal Priority Disciplines Outcome Goal Variances Interventions   Physical Therapy Goal     PT, PT/OT Ongoing, Progressing     Description: Short Term Goals:  1. Mod I for bed mobility  2. Sit<>stand at  with Mod I   3. Stand-pivot transfer at  with Mod I  4. Pt to ambulate 400' at  with Mod I                   History:     Past Medical History:   Diagnosis Date    Addiction to drug     Anxiety     Depression     per pt denies hx of depression    Herniated thoracic disc without myelopathy     Pancreatitis        Past Surgical History:   Procedure Laterality Date    MAGNETIC RESONANCE IMAGING N/A 7/20/2022    Procedure: MRI (MAGNETIC RESONANCE IMAGING);  Surgeon: Tony Trevino DO;  Location: [facility];  Service: Anesthesiology;  Laterality: N/A;  MRI LUMBAR SPINE WITH AND WITHOUT CONTRAST WITH ANESTHESIA    NECK SURGERY      gland removed at age 13 to r/o cancer       Time Tracking:     PT Received On:    PT Start Time: 1000     PT Stop Time: 1034  PT Total Time (min): 34 min     Billable Minutes: Evaluation low complexity 24 minutes and Therapeutic Activity 10 minutes      07/23/2022

## 2022-07-23 NOTE — PT/OT/SLP EVAL
"Occupational Therapy   Evaluation and Discharge Note    Name: Pablo Montenegro  MRN: [id number]   ED due to : c/o low back pain, bowel and bladder dysfunction  Admitting Diagnosis:  Suspected cauda equina syndrome , lumbar stenosis L4-L5 with L 5 root impingement s/p R L4-L5 laminotomy and discectomy, acute cauda equina syndrome, acute urinary retention  Med Hx: anxiety/depression, herniated thoracic disc with myelopathy, pancreatitis, HTN  Recent Surgery: Procedure(s) (LRB):  DISCECTOMY, SPINE, MINIMALLY INVASIVE, USING MICROSCOPE (N/A) 1 Day Post-Op    Recommendations:     Discharge Recommendations: home  Discharge Equipment Recommendations:  walker, rolling, shower chair  Barriers to discharge:       Assessment:     Pablo Montenegro is a 47 y.o. male with a medical diagnosis of Suspected cauda equina syndrome. Pt with good motivation and participation with OT evaluation. Pt able to complete ADL tasks at mod I - SBA level and functional mobility at SBA level. Pt verbalized understanding with spinal pxns. Defer to PT services to focus on mobility. Dc from OT services, no further skilled OT services warranted.    Plan:     During this hospitalization, patient does not require further acute OT services.  Please re-consult if situation changes.    · Plan of Care Reviewed with: patient    Subjective     Chief Complaint: 8/10 back pain, "numbness in feet"  Patient/Family Comments/goals: return to PLOF, return to be able to work again and be out of the hospital by 7/26    Occupational Profile:  Living Environment: pt resides at home with sign other, 5-6 steps with wide B railings, jacuzzi  Tub in master bath, combo in spare bath  Previous level of function: pt reports prior to Dec 2021 pt was I, since "back injury in Dec" pt has steady decline, stated unable to walk since ~ 2 weeks ago, was req'ing assistance with bathing due to weakness  Equipment Used at home:  crutches  Assistance upon Discharge: reports sign other able to " assist upon dc    Pain/Comfort:  · Pain Rating 1: 8/10  · Location 1: back  · Pain Rating Post-Intervention 1: 8/10    Patients cultural, spiritual, Oriental orthodox conflicts given the current situation:      Objective:     Communicated with: RN prior to session.  Patient found with PT services with   upon OT entry to room.    General Precautions: Standard,     Orthopedic Precautions:    Braces: LSO (LSO when OOB or when HOB >30 degrees)  Respiratory Status: Room air     Occupational Performance:    Functional Mobility/Transfers:  · Patient completed Sit <> Stand Transfer with stand by assistance and contact guard assistance  with  rolling walker   · Patient completed Bed <> Chair Transfer using Step Transfer technique with stand by assistance with rolling walker  · Patient completed Toilet Transfer Step Transfer technique with stand by assistance with  rolling walker and grab bars  · Functional Mobility: pt ambulated with RW SBA in room environment    Activities of Daily Living:  · Feeding:  modified independence .  · Upper Body Dressing: modified independence pt donned LSO mod I  · Lower Body Dressing: modified independence donned B socks figure 4, threaded B LE's into pillowcase for simulated LE dressing (no clothing available) mod I   · Toileting: rodriguez cath, no BM since admit .    Cognitive/Visual Perceptual:  Cognitive/Psychosocial Skills:     -       Oriented to: Person, Place, Time and Situation   -       Follows Commands/attention:Follows multistep  commands  -       Safety awareness/insight to disability: intact     Physical Exam:  Sensation:    -       Intact  Upper Extremity Range of Motion:     -       Right Upper Extremity: WNL  -       Left Upper Extremity: WNL  Upper Extremity Strength:    -       Right Upper Extremity: WNL  -       Left Upper Extremity: WNL    AMPAC 6 Click ADL:  AMPAC Total Score:      Treatment & Education:  Education on spinal pxns with ADL tasks, education on donning LSO, discussion  on dc from OT pt agreeable will continue PT to advance mobility (dc from OT to prevent duplication of services)  Education:    Patient left up in chair with call button in reach    GOALS:   Multidisciplinary Problems     Occupational Therapy Goals     Not on file                History:     Past Medical History:   Diagnosis Date    Addiction to drug     Anxiety     Depression     per pt denies hx of depression    Herniated thoracic disc without myelopathy     Pancreatitis        Past Surgical History:   Procedure Laterality Date    MAGNETIC RESONANCE IMAGING N/A 7/20/2022    Procedure: MRI (MAGNETIC RESONANCE IMAGING);  Surgeon: Tony Trevino DO;  Location: [facility];  Service: Anesthesiology;  Laterality: N/A;  MRI LUMBAR SPINE WITH AND WITHOUT CONTRAST WITH ANESTHESIA    NECK SURGERY      gland removed at age 13 to r/o cancer       Time Tracking:     OT Date of Treatment:    OT Start Time: 1008  OT Stop Time: 1031  OT Total Time (min): 23 min    Billable Minutes:eval, low comp    7/23/2022

## 2022-07-24 PROCEDURE — 63600175 PHARM REV CODE 636 W HCPCS: Performed by: STUDENT IN AN ORGANIZED HEALTH CARE EDUCATION/TRAINING PROGRAM

## 2022-07-24 PROCEDURE — 25000003 PHARM REV CODE 250: Performed by: STUDENT IN AN ORGANIZED HEALTH CARE EDUCATION/TRAINING PROGRAM

## 2022-07-24 PROCEDURE — 25000003 PHARM REV CODE 250: Performed by: HOSPITALIST

## 2022-07-24 PROCEDURE — S4991 NICOTINE PATCH NONLEGEND: HCPCS | Performed by: NURSE PRACTITIONER

## 2022-07-24 PROCEDURE — 25000003 PHARM REV CODE 250: Performed by: NURSE PRACTITIONER

## 2022-07-24 PROCEDURE — 11000001 HC ACUTE MED/SURG PRIVATE ROOM

## 2022-07-24 PROCEDURE — 25000003 PHARM REV CODE 250: Performed by: NEUROLOGICAL SURGERY

## 2022-07-24 PROCEDURE — 63600175 PHARM REV CODE 636 W HCPCS: Performed by: HOSPITALIST

## 2022-07-24 RX ORDER — LACTULOSE 10 G/15ML
10 SOLUTION ORAL 3 TIMES DAILY PRN
Status: DISCONTINUED | OUTPATIENT
Start: 2022-07-24 | End: 2022-07-26 | Stop reason: HOSPADM

## 2022-07-24 RX ADMIN — BISACODYL 10 MG: 10 SUPPOSITORY RECTAL at 09:07

## 2022-07-24 RX ADMIN — HYDROCODONE BITARTRATE AND ACETAMINOPHEN 1 TABLET: 10; 325 TABLET ORAL at 10:07

## 2022-07-24 RX ADMIN — HYDROMORPHONE HYDROCHLORIDE 2 MG: 2 INJECTION, SOLUTION INTRAMUSCULAR; INTRAVENOUS; SUBCUTANEOUS at 04:07

## 2022-07-24 RX ADMIN — HYDROMORPHONE HYDROCHLORIDE 2 MG: 2 INJECTION, SOLUTION INTRAMUSCULAR; INTRAVENOUS; SUBCUTANEOUS at 08:07

## 2022-07-24 RX ADMIN — ALUMINUM HYDROXIDE, MAGNESIUM HYDROXIDE, AND SIMETHICONE 30 ML: 200; 200; 20 SUSPENSION ORAL at 10:07

## 2022-07-24 RX ADMIN — METHOCARBAMOL 750 MG: 750 TABLET ORAL at 08:07

## 2022-07-24 RX ADMIN — GABAPENTIN 200 MG: 100 CAPSULE ORAL at 08:07

## 2022-07-24 RX ADMIN — OXYCODONE AND ACETAMINOPHEN 1 TABLET: 10; 325 TABLET ORAL at 02:07

## 2022-07-24 RX ADMIN — HYDROMORPHONE HYDROCHLORIDE 2 MG: 2 INJECTION, SOLUTION INTRAMUSCULAR; INTRAVENOUS; SUBCUTANEOUS at 12:07

## 2022-07-24 RX ADMIN — CEFTRIAXONE SODIUM 1 G: 1 INJECTION, POWDER, FOR SOLUTION INTRAMUSCULAR; INTRAVENOUS at 04:07

## 2022-07-24 RX ADMIN — LACTULOSE 10 G: 10 SOLUTION ORAL at 02:07

## 2022-07-24 RX ADMIN — SENNOSIDES AND DOCUSATE SODIUM 2 TABLET: 50; 8.6 TABLET ORAL at 08:07

## 2022-07-24 RX ADMIN — OXYCODONE AND ACETAMINOPHEN 1 TABLET: 10; 325 TABLET ORAL at 06:07

## 2022-07-24 RX ADMIN — NICOTINE 1 PATCH: 21 PATCH, EXTENDED RELEASE TRANSDERMAL at 08:07

## 2022-07-24 RX ADMIN — METHOCARBAMOL 750 MG: 750 TABLET ORAL at 02:07

## 2022-07-24 RX ADMIN — GABAPENTIN 200 MG: 100 CAPSULE ORAL at 02:07

## 2022-07-24 RX ADMIN — MELATONIN TAB 3 MG 6 MG: 3 TAB at 08:07

## 2022-07-24 RX ADMIN — OXYCODONE AND ACETAMINOPHEN 1 TABLET: 10; 325 TABLET ORAL at 10:07

## 2022-07-24 RX ADMIN — CALCIUM CARBONATE (ANTACID) CHEW TAB 500 MG 500 MG: 500 CHEW TAB at 10:07

## 2022-07-24 RX ADMIN — ALUMINUM HYDROXIDE, MAGNESIUM HYDROXIDE, AND SIMETHICONE 30 ML: 200; 200; 20 SUSPENSION ORAL at 06:07

## 2022-07-24 NOTE — PLAN OF CARE
Problem: Adult Inpatient Plan of Care  Goal: Patient-Specific Goal (Individualized)  Outcome: Ongoing, Progressing  Goal: Absence of Hospital-Acquired Illness or Injury  Outcome: Ongoing, Progressing  Goal: Readiness for Transition of Care  Outcome: Ongoing, Progressing

## 2022-07-24 NOTE — PROGRESS NOTES
POD#2 right L4-5 MIS with discectomy  He is sitting up in bed, NAD  His back pain is minimal  He states his leg pain has resolved  He feels like his bladder sensation continues to improve  He is passing gas, no BM     AFVSS  Olimpia well, no deficits appreciated  Rodriguez in place  Incision c/d/i    Plan: Attempting to wean rodriguez today  He is taking stool softeners  Ok for daily dressing changes  PT/OT  SCDs and lovenox for DVT prophylaxis

## 2022-07-24 NOTE — PROGRESS NOTES
"[facility]  Hospital Medicine Progress Note        Chief Complaint: Inpatient Follow-up for cauda equina syndrome     HPI:   47 y.o. male who  has a past medical history of Addiction to drug, Anxiety, Depression, Herniated thoracic disc without myelopathy, and Pancreatitis., HTN; presents to the ED at [facility] with reports of lower back pain and difficulty  Urinating.  Patient reports he has been having bowel and bladder dysfunction with retention over  The past 2 weeks.  His significant other is at the bedside (Andrae) who is providing some of the history.  It is reported patient "lifted up on an island"  and strained his lower back. IT is reported this happed about a couple of months ago.   Patient reports the symptoms of lower back pain progressed and got worse, along with urinary retention and no bowel movement over the past 2 weeks.  Patient tried using a suppository to facilitate a bowel movement however he reports he could not feel the suppository and it produced no relief. Pt also reports urinary incontinence as well with needing wear diapers.  PT presented to the ED at [facility]. Work up there revealed greater than 800 ml in patient bladder post ovid residual. Pt was transferred to [facility] for   Further imaging in neurosurgical evaluation.  Neurosurgery services were consulted. CT of his L spine was completed upon arrival and was significant for suspected right central disc protrusion at L4-5 with moderate narrowing of the canal and likely impingement of the descending right L5 nerve roots. MRI of the lumbar with and without contrast  demonstrated  Transitional lumbosacral anatomy with partial sacralization of L5, suspected right central disc protrusion at L4-L5 with moderate narrowing of canal and likely impingement of descending right L5 nerve root; moderate to severe neural foraminal narrowing on the right at L4-5 moderate on the left.   Patient reports no chest pain, " shortness a breath, fever, chills, cough, congestion, any sick contacts.  Patient is admitted to hospital medicine services for further management.  Neurosurgery services consult.  Patient was taken to the OR on 07/22 for L4-5 diskectomy.  Return to the floor in stable condition.     Interval Hx:  Still with Torres in place. Discussed with nursing to start bladder training  Ambulating.  No BM but +flatus     Objective/physical exam:  General: In no acute distress, afebrile  Chest: Clear to auscultation bilaterally  Heart: RRR, +S1, S2, no appreciable murmur  Abdomen: Soft, nontender, BS +  MSK: Warm, no lower extremity edema, no clubbing or cyanosis  Neurologic: Alert and oriented x4, Cranial nerve II-XII intact, Strength 5/5 in all 4 extremities    VITAL SIGNS: 24 HRS MIN & MAX LAST   Temp  Min: 98.4 °F (36.9 °C)  Max: 98.6 °F (37 °C) 98.5 °F (36.9 °C)   BP  Min: 113/74  Max: 152/94 129/78   Pulse  Min: 73  Max: 81  73   Resp  Min: 16  Max: 20 20   SpO2  Min: 96 %  Max: 99 % 97 %       Recent Labs   Lab 07/20/22  0212 07/21/22  0520   WBC 19.5* 6.7   RBC 4.75 4.76   HGB 14.6 14.6   HCT 45.8 46.5   MCV 96.4* 97.7*   MCH 30.7 30.7   MCHC 31.9* 31.4*   RDW 13.3 13.4    245   MPV 10.4 10.7*       Recent Labs   Lab 07/20/22  0212 07/21/22  0520    138   K 3.7 3.9   CO2 31* 29   BUN 17.3 10.4   CREATININE 1.03 0.91   CALCIUM 9.6 8.7   ALBUMIN 3.7 3.3*   ALKPHOS 68 59   ALT 16 12   AST 15 15   BILITOT 0.7 0.5          Microbiology Results (last 7 days)     Procedure Component Value Units Date/Time    Blood Culture (site 1) [[id number]]  (Normal) Collected: 07/20/22 0931    Order Status: Completed Specimen: Blood Updated: 07/23/22 1102     CULTURE, BLOOD ([facility]) No Growth At 72 Hours    Blood Culture (site 2) [[id number]]  (Normal) Collected: 07/20/22 0931    Order Status: Completed Specimen: Blood Updated: 07/23/22 1102     CULTURE, BLOOD ([facility]) No Growth At 72 Hours    Urine culture [[id number]]  (Abnormal)   (Susceptibility) Collected: 07/20/22 0342    Order Status: Completed Specimen: Urine, Catheterized Updated: 07/22/22 1005     Urine Culture >/= 100,000 colonies/ml Escherichia coli           See below for Radiology    Scheduled Med:   bisacodyL  10 mg Rectal Daily    cefTRIAXone (ROCEPHIN) IVPB  1 g Intravenous Q24H    gabapentin  200 mg Oral TID    methocarbamoL  750 mg Oral TID    nicotine  1 patch Transdermal Daily    senna-docusate 8.6-50 mg  2 tablet Oral BID        Continuous Infusions:   sodium chloride 0.9%          PRN Meds:  acetaminophen, aluminum-magnesium hydroxide-simethicone, calcium carbonate, dextrose 10%, dextrose 10%, glucagon (human recombinant), glucose, glucose, hydrALAZINE, HYDROcodone-acetaminophen, HYDROcodone-acetaminophen, HYDROmorphone, melatonin, morphine, morphine, naloxone, ondansetron, oxyCODONE-acetaminophen, prochlorperazine, simethicone, sodium chloride 0.9%       Assessment/Plan:   Acute cauda equina syndrome - suspected per MRI  Acute urinary retention  Acute lumbar stenosis- L4-5 with L5 root impingement  HTN- essential  Lumbar strain- per pt reports 2 week ago  Nicotine dependence- cigarettes     Hx of anxiety, depression, poly-substance abuse (denies any recent use)     s/p L4-5 MIS diskectomy on 7/22    DC rodriguez today and continue bladder training. OK for in and out as needed for post-void >600cc  Continue PT/OT  Potential could go home in next 24 - 48 hours with home health    Patient condition:  Stable    Anticipated discharge and Disposition: TBD      All diagnosis and differential diagnosis have been reviewed; assessment and plan has been documented; I have personally reviewed the labs and test results that are presently available; I have reviewed the patients medication list; I have reviewed the consulting providers response and recommendations. I have reviewed or attempted to review medical records based upon their availability    All of the patient's questions  have been  addressed and answered. Patient's is agreeable to the above stated plan. I will continue to monitor closely and make adjustments to medical management as needed.  _____________________________________________________________________      Radiology:  SURG FL Surgery Fluoro Usage  See OP Notes for results.     IMPRESSION: See OP Notes for results.     This procedure was auto-finalized by: Virtual Radiologist      Louis Angeles MD   07/24/2022

## 2022-07-24 NOTE — PLAN OF CARE
Problem: Infection  Goal: Absence of Infection Signs and Symptoms  7/23/2022 2041 by Alison Sam RN  Outcome: Ongoing, Progressing  7/23/2022 2037 by Alison Sam RN  Outcome: Ongoing, Progressing     Problem: Adult Inpatient Plan of Care  Goal: Patient-Specific Goal (Individualized)  7/23/2022 2041 by Alison Sam RN  Outcome: Ongoing, Progressing  7/23/2022 2037 by Alison Sam RN  Outcome: Ongoing, Progressing  Goal: Absence of Hospital-Acquired Illness or Injury  7/23/2022 2041 by Alison Sam RN  Outcome: Ongoing, Progressing  7/23/2022 2037 by lAison Sam RN  Outcome: Ongoing, Progressing  Goal: Readiness for Transition of Care  7/23/2022 2041 by Alison Sam RN  Outcome: Ongoing, Progressing  7/23/2022 2037 by Alison Sam RN  Outcome: Ongoing, Progressing     Problem: Skin Injury Risk Increased  Goal: Skin Health and Integrity  7/23/2022 2041 by Alison Sam RN  Outcome: Ongoing, Progressing  7/23/2022 2037 by Alison Sam RN  Outcome: Ongoing, Progressing     Problem: Fall Injury Risk  Goal: Absence of Fall and Fall-Related Injury  7/23/2022 2041 by Alison Sam RN  Outcome: Ongoing, Progressing  7/23/2022 2037 by Alison Sam RN  Outcome: Ongoing, Progressing

## 2022-07-25 LAB
BACTERIA BLD CULT: NORMAL
BACTERIA BLD CULT: NORMAL

## 2022-07-25 PROCEDURE — 25000003 PHARM REV CODE 250: Performed by: NEUROLOGICAL SURGERY

## 2022-07-25 PROCEDURE — S4991 NICOTINE PATCH NONLEGEND: HCPCS | Performed by: NURSE PRACTITIONER

## 2022-07-25 PROCEDURE — 11000001 HC ACUTE MED/SURG PRIVATE ROOM

## 2022-07-25 PROCEDURE — 97530 THERAPEUTIC ACTIVITIES: CPT

## 2022-07-25 PROCEDURE — 25000003 PHARM REV CODE 250: Performed by: HOSPITALIST

## 2022-07-25 PROCEDURE — 63600175 PHARM REV CODE 636 W HCPCS: Performed by: HOSPITALIST

## 2022-07-25 PROCEDURE — 25000003 PHARM REV CODE 250: Performed by: NURSE PRACTITIONER

## 2022-07-25 RX ORDER — GABAPENTIN 300 MG/1
300 CAPSULE ORAL 3 TIMES DAILY
Status: DISCONTINUED | OUTPATIENT
Start: 2022-07-25 | End: 2022-07-26 | Stop reason: HOSPADM

## 2022-07-25 RX ADMIN — HYDROCODONE BITARTRATE AND ACETAMINOPHEN 1 TABLET: 10; 325 TABLET ORAL at 07:07

## 2022-07-25 RX ADMIN — OXYCODONE AND ACETAMINOPHEN 1 TABLET: 10; 325 TABLET ORAL at 03:07

## 2022-07-25 RX ADMIN — HYDROMORPHONE HYDROCHLORIDE 2 MG: 2 INJECTION, SOLUTION INTRAMUSCULAR; INTRAVENOUS; SUBCUTANEOUS at 02:07

## 2022-07-25 RX ADMIN — HYDROMORPHONE HYDROCHLORIDE 2 MG: 2 INJECTION, SOLUTION INTRAMUSCULAR; INTRAVENOUS; SUBCUTANEOUS at 08:07

## 2022-07-25 RX ADMIN — OXYCODONE AND ACETAMINOPHEN 1 TABLET: 10; 325 TABLET ORAL at 07:07

## 2022-07-25 RX ADMIN — GABAPENTIN 300 MG: 300 CAPSULE ORAL at 08:07

## 2022-07-25 RX ADMIN — GABAPENTIN 300 MG: 300 CAPSULE ORAL at 03:07

## 2022-07-25 RX ADMIN — SENNOSIDES AND DOCUSATE SODIUM 2 TABLET: 50; 8.6 TABLET ORAL at 08:07

## 2022-07-25 RX ADMIN — HYDROCODONE BITARTRATE AND ACETAMINOPHEN 1 TABLET: 10; 325 TABLET ORAL at 02:07

## 2022-07-25 RX ADMIN — METHOCARBAMOL 750 MG: 750 TABLET ORAL at 08:07

## 2022-07-25 RX ADMIN — NICOTINE 1 PATCH: 21 PATCH, EXTENDED RELEASE TRANSDERMAL at 08:07

## 2022-07-25 RX ADMIN — HYDROMORPHONE HYDROCHLORIDE 2 MG: 2 INJECTION, SOLUTION INTRAMUSCULAR; INTRAVENOUS; SUBCUTANEOUS at 05:07

## 2022-07-25 RX ADMIN — HYDROMORPHONE HYDROCHLORIDE 2 MG: 2 INJECTION, SOLUTION INTRAMUSCULAR; INTRAVENOUS; SUBCUTANEOUS at 01:07

## 2022-07-25 RX ADMIN — OXYCODONE AND ACETAMINOPHEN 1 TABLET: 10; 325 TABLET ORAL at 11:07

## 2022-07-25 RX ADMIN — METHOCARBAMOL 750 MG: 750 TABLET ORAL at 03:07

## 2022-07-25 RX ADMIN — BISACODYL 10 MG: 10 SUPPOSITORY RECTAL at 08:07

## 2022-07-25 RX ADMIN — HYDROCODONE BITARTRATE AND ACETAMINOPHEN 1 TABLET: 10; 325 TABLET ORAL at 11:07

## 2022-07-25 NOTE — PT/OT/SLP PROGRESS
Physical Therapy  Treatment    Pablo Montenegro   MRN: [id number]   Admitting Diagnosis: Suspected cauda equina syndrome    PT Received On: 07/25/22  PT Start Time: 1120     PT Stop Time: 1130    PT Total Time (min): 10 min       Billable Minutes:  Therapeutic Activity x 10 minutes    Treatment Type: Treatment  PT/PTA: PT     PTA Visit Number: 1       General Precautions: Standard, fall  Orthopedic Precautions: spinal precautions   Braces: LSO  Respiratory Status: Room air    Spiritual, Cultural Beliefs, Faith Practices, Values that Affect Care: no    Subjective:  Communicated with RN prior to session.  Patient asking for pain medication prior to mobilizing.    Pain/Comfort  Pain Rating 1: 8/10  Location - Side 1: Left  Location - Orientation 1: lower  Location 1: back  Pain Addressed 1: Nurse notified, Reposition, Other (see comments) (Nurse administered pain meds immediately prior to patient mobility.)  Pain Rating Post-Intervention 1: 8/10 (No worsening of pain following ambulation.)    Objective:   Patient found with: peripheral IV    Functional Mobility:  Bed Mobility:   Did not observe activity.    Transfers:  Supervision with sit to stand transfer.    Gait:   Antalgic gait pattern, forward flexed trunk, decreased gait speed and step length. Patient ambulated 200 feet with RW and CGA-Clary; LLE buckled twice with no LOB and patient able to correct.    Stairs:  Did not observed activity.      Balance:   Static Sit: GOOD+: Takes MAXIMAL challenges from all directions.    Dynamic Sit: GOOD+: Maintains balance through MAXIMAL excursions of active trunk motion  Static Stand: GOOD+: Takes MAXIMAL challenges from all directions  Dynamic stand: POOR+: Needs MIN (minimal ) assist during gait       Therapeutic Activities:  Patient ambulated 200 feet with RW.      AM-PAC 6 CLICK MOBILITY  How much help from another person does this patient currently need?   1 = Unable, Total/Dependent Assistance  2 = A lot,  Maximum/Moderate Assistance  3 = A little, Minimum/Contact Guard/Supervision  4 = None, Modified [place]/Independent    Turning over in bed (including adjusting bedclothes, sheets and blankets)?: 3  Sitting down on and standing up from a chair with arms (e.g., wheelchair, bedside commode, etc.): 3  Moving from lying on back to sitting on the side of the bed?: 3  Moving to and from a bed to a chair (including a wheelchair)?: 3  Need to walk in hospital room?: 3  Climbing 3-5 steps with a railing?: 3  Basic Mobility Total Score: 18    AM-PAC Raw Score CMS G-Code Modifier Level of Impairment Assistance   6 % Total / Unable   7 - 9 CM 80 - 100% Maximal Assist   10 - 14 CL 60 - 80% Moderate Assist   15 - 19 CK 40 - 60% Moderate Assist   20 - 22 CJ 20 - 40% Minimal Assist   23 CI 1-20% SBA / CGA   24 CH 0% Independent/ Mod I     Patient left up in chair with all lines intact, call button in reach and family present.    Assessment:  Pablo Montenegro is a 47 y.o. male with a medical diagnosis of Suspected cauda equina syndrome, s/p L4-L5 MIS discectomy.    Rehab identified problem list/impairments: Rehab identified problem list/impairments: weakness, impaired endurance, gait instability, impaired functional mobility, impaired balance, decreased lower extremity function, pain    Rehab potential is excellent.    Activity tolerance: Excellent    Discharge recommendations: Discharge Facility/Level of Care Needs: outpatient PT, home health PT     Barriers to discharge: Impaired mobility.    Equipment recommendations: Equipment Needed After Discharge: walker, rolling     GOALS:   Multidisciplinary Problems     Physical Therapy Goals        Problem: Physical Therapy    Goal Priority Disciplines Outcome Goal Variances Interventions   Physical Therapy Goal     PT, PT/OT Ongoing, Progressing     Description: Short Term Goals:  1. Mod I for bed mobility  2. Sit<>stand at RW with Mod I   3. Stand-pivot transfer at RW with  Mod I  4. Pt to ambulate 400' at  with Mod I                   PLAN:    Patient to be seen daily  to address the above listed problems via gait training, therapeutic activities, therapeutic exercises, neuromuscular re-education  Plan of Care expires: 08/22/22  Plan of Care reviewed with: patient, family         07/25/2022

## 2022-07-25 NOTE — PROGRESS NOTES
"[facility]  Hospital Medicine Progress Note        Chief Complaint: Inpatient Follow-up for back pain    HPI:   47 y.o. male who  has a past medical history of Addiction to drug, Anxiety, Depression, Herniated thoracic disc without myelopathy, and Pancreatitis., HTN; presents to the ED at [facility] with reports of lower back pain and difficulty  Urinating.  Patient reports he has been having bowel and bladder dysfunction with retention over  The past 2 weeks.  His significant other is at the bedside (Andrae) who is providing some of the history.  It is reported patient "lifted up on an island"  and strained his lower back. IT is reported this happed about a couple of months ago.   Patient reports the symptoms of lower back pain progressed and got worse, along with urinary retention and no bowel movement over the past 2 weeks.  Patient tried using a suppository to facilitate a bowel movement however he reports he could not feel the suppository and it produced no relief. Pt also reports urinary incontinence as well with needing wear diapers.  PT presented to the ED at [facility]. Work up there revealed greater than 800 ml in patient bladder post ovid residual. Pt was transferred to [facility] for   Further imaging in neurosurgical evaluation.  Neurosurgery services were consulted. CT of his L spine was completed upon arrival and was significant for suspected right central disc protrusion at L4-5 with moderate narrowing of the canal and likely impingement of the descending right L5 nerve roots. MRI of the lumbar with and without contrast  demonstrated  Transitional lumbosacral anatomy with partial sacralization of L5, suspected right central disc protrusion at L4-L5 with moderate narrowing of canal and likely impingement of descending right L5 nerve root; moderate to severe neural foraminal narrowing on the right at L4-5 moderate on the left.   Patient reports no chest pain, shortness a " breath, fever, chills, cough, congestion, any sick contacts.  Patient is admitted to hospital medicine services for further management.  Neurosurgery services consult.  Patient was taken to the OR on 07/22 for L4-5 diskectomy.  Return to the floor in stable condition.    Interval Hx:  Nursing attempted bladder training with Torres catheter in place yesterday but patient unable to really telling is to avoid.  We discussed just taking the Torres catheter out today and monitor throughout the day.  If continued urinary retention will replace Torres catheter and try again in a week.  He can be discharged with Torres catheter if needed.  He does report some increased back pain this morning but is still ambulating.  Discussed it may just be that he is moving around more now.  He is anxious to be discharged in the next 24-48 hours. +BM yesterday    Objective/physical exam:  General: In no acute distress, afebrile  Chest: Clear to auscultation bilaterally  Heart: RRR, +S1, S2, no appreciable murmur  Abdomen: Soft, nontender, BS +  MSK: Warm, no lower extremity edema, no clubbing or cyanosis  Neurologic: Alert and oriented x4, Cranial nerve II-XII intact, Strength 5/5 in all 4 extremities    VITAL SIGNS: 24 HRS MIN & MAX LAST   Temp  Min: 97.7 °F (36.5 °C)  Max: 98.8 °F (37.1 °C) 97.7 °F (36.5 °C)   BP  Min: 126/68  Max: 154/90 (!) 154/90   Pulse  Min: 54  Max: 79  (!) 54   Resp  Min: 14  Max: 20 16   SpO2  Min: 96 %  Max: 98 % 96 %       Recent Labs   Lab 07/20/22  0212 07/21/22  0520   WBC 19.5* 6.7   RBC 4.75 4.76   HGB 14.6 14.6   HCT 45.8 46.5   MCV 96.4* 97.7*   MCH 30.7 30.7   MCHC 31.9* 31.4*   RDW 13.3 13.4    245   MPV 10.4 10.7*       Recent Labs   Lab 07/20/22  0212 07/21/22  0520    138   K 3.7 3.9   CO2 31* 29   BUN 17.3 10.4   CREATININE 1.03 0.91   CALCIUM 9.6 8.7   ALBUMIN 3.7 3.3*   ALKPHOS 68 59   ALT 16 12   AST 15 15   BILITOT 0.7 0.5          Microbiology Results (last 7 days)     Procedure  Component Value Units Date/Time    Blood Culture (site 1) [[id number]]  (Normal) Collected: 07/20/22 0931    Order Status: Completed Specimen: Blood Updated: 07/24/22 1102     CULTURE, BLOOD ([facility]) No Growth At 96 Hours    Blood Culture (site 2) [[id number]]  (Normal) Collected: 07/20/22 0931    Order Status: Completed Specimen: Blood Updated: 07/24/22 1101     CULTURE, BLOOD ([facility]) No Growth At 96 Hours    Urine culture [[id number]]  (Abnormal)  (Susceptibility) Collected: 07/20/22 0342    Order Status: Completed Specimen: Urine, Catheterized Updated: 07/22/22 1005     Urine Culture >/= 100,000 colonies/ml Escherichia coli           See below for Radiology    Scheduled Med:   bisacodyL  10 mg Rectal Daily    cefTRIAXone (ROCEPHIN) IVPB  1 g Intravenous Q24H    gabapentin  200 mg Oral TID    methocarbamoL  750 mg Oral TID    nicotine  1 patch Transdermal Daily    senna-docusate 8.6-50 mg  2 tablet Oral BID        Continuous Infusions:   sodium chloride 0.9%          PRN Meds:  acetaminophen, aluminum-magnesium hydroxide-simethicone, calcium carbonate, dextrose 10%, dextrose 10%, glucagon (human recombinant), glucose, glucose, hydrALAZINE, HYDROcodone-acetaminophen, HYDROcodone-acetaminophen, HYDROmorphone, lactulose 10 gram/15 ml, melatonin, morphine, morphine, naloxone, ondansetron, oxyCODONE-acetaminophen, prochlorperazine, simethicone, sodium chloride 0.9%       Assessment/Plan:   Acute cauda equina syndrome - suspected per MRI  Acute urinary retention  Acute lumbar stenosis- L4-5 with L5 root impingement  HTN- essential  Lumbar strain- per pt reports 2 week ago  Nicotine dependence- cigarettes     Hx of anxiety, depression, poly-substance abuse (denies any recent use)     s/p L4-5 MIS diskectomy on 7/22     DC rodriguez today. OK for in and out as needed for post-void >600cc  Continue PT/OT  Potential could go home in next 24 - 48 hours with home health  Monitor urine output. +BM    Patient condition:   Stable    Anticipated discharge and Disposition: 24 - 48 hours, home health      All diagnosis and differential diagnosis have been reviewed; assessment and plan has been documented; I have personally reviewed the labs and test results that are presently available; I have reviewed the patients medication list; I have reviewed the consulting providers response and recommendations. I have reviewed or attempted to review medical records based upon their availability    All of the patient's questions have been  addressed and answered. Patient's is agreeable to the above stated plan. I will continue to monitor closely and make adjustments to medical management as needed.  _____________________________________________________________________      Radiology:  SURG [place] Surgery Fluoro Usage  See OP Notes for results.     IMPRESSION: See OP Notes for results.     This procedure was auto-finalized by: Virtual Radiologist      Louis Angeles MD   07/25/2022

## 2022-07-25 NOTE — PROGRESS NOTES
POD#3 right L4-5 MIS with discectomy  He is sitting up in bed, NAD  He c/o soreness at the incision site  He states his leg pain has resolved  He feels like his bladder sensation has improved, rodriguez was dc'd this morning  He is passing gas    AFVSS  Olimpia well, no deficits appreciated  Incision c/d/i  LSO in place    Plan: We will see how he does with urinating today; plans to reinsert cath if he is unable to void  He is taking stool softeners  Ok for daily dressing changes  PT/OT  SCDs and lovenox for DVT prophylaxis  Plan to dc home once voiding

## 2022-07-26 VITALS
BODY MASS INDEX: 27.72 KG/M2 | WEIGHT: 198 LBS | OXYGEN SATURATION: 99 % | RESPIRATION RATE: 18 BRPM | HEIGHT: 71 IN | TEMPERATURE: 98 F | DIASTOLIC BLOOD PRESSURE: 71 MMHG | HEART RATE: 75 BPM | SYSTOLIC BLOOD PRESSURE: 114 MMHG

## 2022-07-26 PROCEDURE — S4991 NICOTINE PATCH NONLEGEND: HCPCS | Performed by: NURSE PRACTITIONER

## 2022-07-26 PROCEDURE — 63600175 PHARM REV CODE 636 W HCPCS: Performed by: HOSPITALIST

## 2022-07-26 PROCEDURE — 25000003 PHARM REV CODE 250: Performed by: NEUROLOGICAL SURGERY

## 2022-07-26 PROCEDURE — 25000003 PHARM REV CODE 250: Performed by: HOSPITALIST

## 2022-07-26 PROCEDURE — 25000003 PHARM REV CODE 250: Performed by: NURSE PRACTITIONER

## 2022-07-26 RX ORDER — HYDROCODONE BITARTRATE AND ACETAMINOPHEN 10; 325 MG/1; MG/1
1 TABLET ORAL EVERY 4 HOURS PRN
Qty: 28 TABLET | Refills: 0 | Status: SHIPPED | OUTPATIENT
Start: 2022-07-26 | End: 2022-08-02

## 2022-07-26 RX ORDER — IBUPROFEN 200 MG
1 TABLET ORAL DAILY
Qty: 30 PATCH | Refills: 0 | Status: SHIPPED | OUTPATIENT
Start: 2022-07-27 | End: 2022-08-26

## 2022-07-26 RX ORDER — AMOXICILLIN 250 MG
2 CAPSULE ORAL 2 TIMES DAILY
Qty: 120 TABLET | Refills: 0 | Status: SHIPPED | OUTPATIENT
Start: 2022-07-26 | End: 2022-08-25

## 2022-07-26 RX ORDER — GABAPENTIN 300 MG/1
300 CAPSULE ORAL 3 TIMES DAILY
Qty: 90 CAPSULE | Refills: 11 | Status: SHIPPED | OUTPATIENT
Start: 2022-07-26 | End: 2023-07-26

## 2022-07-26 RX ORDER — TAMSULOSIN HYDROCHLORIDE 0.4 MG/1
0.4 CAPSULE ORAL DAILY
Status: DISCONTINUED | OUTPATIENT
Start: 2022-07-26 | End: 2022-07-26 | Stop reason: HOSPADM

## 2022-07-26 RX ORDER — TAMSULOSIN HYDROCHLORIDE 0.4 MG/1
0.4 CAPSULE ORAL DAILY
Qty: 30 CAPSULE | Refills: 11 | Status: SHIPPED | OUTPATIENT
Start: 2022-07-26 | End: 2023-07-26

## 2022-07-26 RX ADMIN — HYDROCODONE BITARTRATE AND ACETAMINOPHEN 1 TABLET: 10; 325 TABLET ORAL at 03:07

## 2022-07-26 RX ADMIN — METHOCARBAMOL 750 MG: 750 TABLET ORAL at 08:07

## 2022-07-26 RX ADMIN — BISACODYL 10 MG: 10 SUPPOSITORY RECTAL at 08:07

## 2022-07-26 RX ADMIN — HYDROMORPHONE HYDROCHLORIDE 2 MG: 2 INJECTION, SOLUTION INTRAMUSCULAR; INTRAVENOUS; SUBCUTANEOUS at 05:07

## 2022-07-26 RX ADMIN — HYDROMORPHONE HYDROCHLORIDE 2 MG: 2 INJECTION, SOLUTION INTRAMUSCULAR; INTRAVENOUS; SUBCUTANEOUS at 01:07

## 2022-07-26 RX ADMIN — OXYCODONE AND ACETAMINOPHEN 1 TABLET: 10; 325 TABLET ORAL at 08:07

## 2022-07-26 RX ADMIN — NICOTINE 1 PATCH: 21 PATCH, EXTENDED RELEASE TRANSDERMAL at 08:07

## 2022-07-26 RX ADMIN — GABAPENTIN 300 MG: 300 CAPSULE ORAL at 08:07

## 2022-07-26 RX ADMIN — SENNOSIDES AND DOCUSATE SODIUM 2 TABLET: 50; 8.6 TABLET ORAL at 08:07

## 2022-07-26 NOTE — NURSING
Pt did not void at noon, bladder scan was 373ml. In-and-out cath put out 350cc of urine. Pt tolerated procedure fine, no adverse reactions noted. Pt DTV at 1800.

## 2022-07-26 NOTE — DISCHARGE SUMMARY
"[facility] Hospital Medicine Discharge Summary    Admit Date: 7/20/2022  Discharge Date and Time: 7/26/20228:32 AM  Admitting Physician: Hospitalist team   Discharging Physician: Louis Angeles MD.  Primary Care Physician: Lio Patel MD      Discharge Diagnoses:  Acute cauda equina syndrome - suspected per MRI  Acute urinary retention  Acute lumbar stenosis- L4-5 with L5 root impingement  HTN- essential  Lumbar strain- per pt reports 2 week ago  Nicotine dependence- cigarettes     Hx of anxiety, depression, poly-substance abuse (denies any recent use)    Hospital Course:   47 y.o. male who  has a past medical history of Addiction to drug, Anxiety, Depression, Herniated thoracic disc without myelopathy, and Pancreatitis., HTN; presents to the ED at [facility] with reports of lower back pain and difficulty  Urinating.  Patient reports he has been having bowel and bladder dysfunction with retention over  The past 2 weeks.  His significant other is at the bedside (Laithluisa) who is providing some of the history.  It is reported patient "lifted up on an island"  and strained his lower back. IT is reported this happed about a couple of months ago.   Patient reports the symptoms of lower back pain progressed and got worse, along with urinary retention and no bowel movement over the past 2 weeks.  Patient tried using a suppository to facilitate a bowel movement however he reports he could not feel the suppository and it produced no relief. Pt also reports urinary incontinence as well with needing wear diapers.  PT presented to the ED at [facility]. Work up there revealed greater than 800 ml in patient bladder post ovid residual. Pt was transferred to [facility] for   Further imaging in neurosurgical evaluation.  Neurosurgery services were consulted. CT of his L spine was completed upon arrival and was significant for suspected right central disc protrusion at L4-5 with moderate narrowing of the " "canal and likely impingement of the descending right L5 nerve roots. MRI of the lumbar with and without contrast  demonstrated  Transitional lumbosacral anatomy with partial sacralization of L5, suspected right central disc protrusion at L4-L5 with moderate narrowing of canal and likely impingement of descending right L5 nerve root; moderate to severe neural foraminal narrowing on the right at L4-5 moderate on the left.   Patient reports no chest pain, shortness a breath, fever, chills, cough, congestion, any sick contacts.  Patient is admitted to hospital medicine services for further management.  Neurosurgery services consult.  Patient was taken to the OR on 07/22 for L4-5 diskectomy.  Return to the floor in stable condition. Attempted bladder training on the floor but unfortunately still with some urinary retention.  Otherwise he is feeling well and ambulating.  Bowels moving.  Will dc with rodriguez in place.  Will arrange for outpatient urology follow up.  Will also follow with NSY in clinic.     Vitals:  Blood pressure 114/71, pulse 75, temperature 98.2 °F (36.8 °C), temperature source Oral, resp. rate 18, height 5' 11" (1.803 m), weight 89.8 kg (198 lb), SpO2 99 %..    Physical Exam:  Awake, Alert, Oriented x 3, No new focal Neurologic deficit, Normal Affect  NC/AT, PERRLA, EOMI  Supple neck, no JVD, No cervical lymphadenopathy  Symmetrical chest, Good air entry bilaterally. No rhonchi, wheezes, crackles appreciated  RRR, No gallop, rub or murmur  +ve Bowel sounds x4, Abd soft and non tender, no rebound, guarding or rigidity  No Cyanosis, cludding or edema, No new rash or bruises    Procedures Performed: No admission procedures for hospital encounter.     Significant Diagnostic Studies: See Full reports for all details  Admission on 07/20/2022   Component Date Value    Color, UA 07/20/2022 Dark Yellow (A)    Appearance, UA 07/20/2022 Cloudy (A)    Specific Gravity,  07/20/2022 1.025     pH, UA 07/20/2022 " 5.5     Protein, UA 07/20/2022 1+ (A)    Glucose, UA 07/20/2022 Negative     Ketones, UA 07/20/2022 Trace (A)    Blood, UA 07/20/2022 2+ (A)    Bilirubin, UA 07/20/2022 1+ (A)    Urobilinogen, UA 07/20/2022 1.0     Nitrites, UA 07/20/2022 Negative     Leukocyte Esterase, UA 07/20/2022 3+ (A)    Sed Rate 07/20/2022 22 (A)    C-Reactive Protein 07/20/2022 173.00 (A)    RBC, UA 07/20/2022 <5     WBC, UA 07/20/2022 340 (A)    Squamous Epithelial Cell* 07/20/2022 <5     Bacteria, UA 07/20/2022 4+ (A)    Urine Culture 07/20/2022 >/= 100,000 colonies/ml Escherichia coli (A)    Amphetamines, Urine 07/20/2022 Negative     Barbituates, Urine 07/20/2022 Negative     Benzodiazepine, Urine 07/20/2022 Negative     Cannabinoids, Urine 07/20/2022 Negative     Cocaine, Urine 07/20/2022 Negative     Fentanyl, Urine 07/20/2022 Negative     MDMA, Urine 07/20/2022 Negative     Opiates, Urine 07/20/2022 Negative     Phencyclidine, Urine 07/20/2022 Negative     pH, Urine 07/20/2022 5.5     Specific Gravity, Urine * 07/20/2022 1.025     Creatine Kinase 07/20/2022 71     CULTURE, BLOOD ([facility]) 07/20/2022 No Growth at 5 days     CULTURE, BLOOD ([facility]) 07/20/2022 No Growth at 5 days     Sodium Level 07/21/2022 138     Potassium Level 07/21/2022 3.9     Chloride 07/21/2022 101     Carbon Dioxide 07/21/2022 29     Glucose Level 07/21/2022 142 (A)    Blood Urea Nitrogen 07/21/2022 10.4     Creatinine 07/21/2022 0.91     Calcium Level Total 07/21/2022 8.7     Protein Total 07/21/2022 6.0 (A)    Albumin Level 07/21/2022 3.3 (A)    Globulin 07/21/2022 2.7     Albumin/Globulin Ratio 07/21/2022 1.2     Bilirubin Total 07/21/2022 0.5     Alkaline Phosphatase 07/21/2022 59     Alanine Aminotransferase 07/21/2022 12     Aspartate Aminotransfera* 07/21/2022 15     Estimated GFR-Non Morenita* 07/21/2022 >60     WBC 07/21/2022 6.7     RBC 07/21/2022 4.76     Hgb 07/21/2022 14.6     Hct 07/21/2022 46.5     MCV  07/21/2022 97.7 (A)    MCH 07/21/2022 30.7     MCHC 07/21/2022 31.4 (A)    RDW 07/21/2022 13.4     Platelet 07/21/2022 245     MPV 07/21/2022 10.7 (A)    Neut % 07/21/2022 55.2     Lymph % 07/21/2022 32.1     Mono % 07/21/2022 7.8     Eos % 07/21/2022 3.9     Basophil % 07/21/2022 0.7     Lymph # 07/21/2022 2.14     Neut # 07/21/2022 3.7     Mono # 07/21/2022 0.52     Eos # 07/21/2022 0.26     Baso # 07/21/2022 0.05     IG# 07/21/2022 0.02     IG% 07/21/2022 0.3     NRBC% 07/21/2022 0.0     Group & Rh 07/21/2022 A POS     Indirect Liane GEL 07/21/2022 NEG         Microbiology Results (last 7 days)     Procedure Component Value Units Date/Time    Blood Culture (site 1) [[id number]]  (Normal) Collected: 07/20/22 0931    Order Status: Completed Specimen: Blood Updated: 07/25/22 1103     CULTURE, BLOOD ([facility]) No Growth at 5 days    Blood Culture (site 2) [[id number]]  (Normal) Collected: 07/20/22 0931    Order Status: Completed Specimen: Blood Updated: 07/25/22 1103     CULTURE, BLOOD ([facility]) No Growth at 5 days    Urine culture [[id number]]  (Abnormal)  (Susceptibility) Collected: 07/20/22 0342    Order Status: Completed Specimen: Urine, Catheterized Updated: 07/22/22 1005     Urine Culture >/= 100,000 colonies/ml Escherichia coli           CT Lumbar Spine With Contrast    Result Date: 7/20/2022  EXAMINATION: CT LUMBAR SPINE WITH CONTRAST CLINICAL HISTORY: Low back pain, cauda equina syndrome suspected; TECHNIQUE: CT images of the lumbar spine with contrast.  Axial, coronal, and sagittal reformatted images were obtained. Dose length product is 622 mGycm. Automatic exposure control, adjustment of mA/kV or iterative reconstruction technique was used to limit radiation dose. COMPARISON: CT abdomen pelvis dated 12/27/2022 FINDINGS: There is transitional lumbosacral anatomy with the lowest fully formed disc space labeled as L5-S1 and partial sacralization of L5.  Lumbar alignment is normal.  The vertebral  body heights are maintained. There is no acute fracture identified.  There is mild disc height loss at L4-5 and L5-S1 with small multilevel marginal osteophytes.  There is mild facet arthropathy. There is mild degenerative narrowing of the canal at L2-L3 and L3-L4 with disc bulges and facet hypertrophy.  At L4-L5, there is a disc bulge with right central disc protrusion which causes moderate narrowing of the canal and likely impingement on the descending right L5 nerve roots.  There is moderate to severe narrowing of the right L4-L5 neural foramina, moderate on the left.  The paraspinal soft tissues are unremarkable.  There is no drainable fluid collection.     1. 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 2. Suspected right central disc protrusion at L4-L5 with moderate narrowing of the canal and likely impingement on the descending right L5 nerve roots. 3. Moderate to severe neural foraminal narrowing on the right at L4-L5, moderate on the left. Electronically signed by: Margo Sigala Date:    07/20/2022 Time:    08:07    MRI Lumbar Spine W WO Cont    Result Date: 7/20/2022  EXAMINATION: MRI LUMBAR SPINE W WO CONTRAST CLINICAL HISTORY: Low back pain, cauda equina syndrome suspected; TECHNIQUE: Multiplanar multisequence MR images of the lumbar spine are obtained with and without contrast. COMPARISON: CT of the lumbar spine dated 07/20/2022 FINDINGS: There is transitional lumbosacral anatomy with the lowest disc space labeled as L5-S1 and partial sacralization at L5. Alignment is normal without subluxation.  Vertebral body heights are maintained.  There is mild degenerate endplate edema at L4-5. The conus terminates at the level of L1.  It is normal in signal and contour.  There is enhancement of the cauda equina nerve roots. Disc spaces, spinal canal and neural foramina are as follows: L1-L2: No disc herniation.  No significant spinal canal or neural foraminal stenosis.  L2-L3: Disc bulge and facet hypertrophy with mild narrowing of the spinal canal.  Mild bilateral neural foraminal stenosis. L3-L4: Disc bulge and facet hypertrophy with mild narrowing of the spinal canal.  Mild bilateral neural foraminal stenosis. L4-L5: A right central disc extrusion measures 9 x 12 x 17 mm in size and causes severe spinal canal stenosis with complete effacement of the CSF space and impingement on the cord equina nerve roots.  There is mild-to-moderate bilateral neural foraminal stenosis. L5-S1: No disc herniation.  Bilateral facet hypertrophy.  No significant spinal canal or neural foraminal stenosis. No significant abnormality within the visualized paraspinous musculature.     1. 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 2. Large right central disc extrusion at L4-5 with severe canal stenosis. 3. Cauda equina nerve root enhancement is nonspecific and may be inflammatory. 4. Mild degenerative narrowing of the canal at L2-L3 and L4-5. 5. Multilevel neural foraminal stenoses as described. Electronically signed by: Margo Sigala Date:    07/20/2022 Time:    15:01    SURG FL Surgery Fluoro Usage    Result Date: 7/22/2022  See OP Notes for results. IMPRESSION: See OP Notes for results. This procedure was auto-finalized by: Virtual Radiologist  - pulls last radiology orders        Medication List      START taking these medications    gabapentin 300 MG capsule  Commonly known as: NEURONTIN  Take 1 capsule (300 mg total) by mouth 3 (three) times daily.  Replaces: gabapentin 600 MG tablet     HYDROcodone-acetaminophen  mg per tablet  Commonly known as: NORCO  Take 1 tablet by mouth every 4 (four) hours as needed for Pain.     nicotine 21 mg/24 hr  Commonly known as: NICODERM CQ  Place 1 patch onto the skin once daily.  Start taking on: July 27, 2022  Replaces: nicotine 14 mg/24 hr     senna-docusate 8.6-50 mg 8.6-50 mg per tablet  Commonly known as:  PERICOLACE  Take 2 tablets by mouth 2 (two) times daily.     tamsulosin 0.4 mg Cap  Commonly known as: FLOMAX  Take 1 capsule (0.4 mg total) by mouth once daily.        CONTINUE taking these medications    buprenorphine-naloxone 8-2 mg 8-2 mg  Commonly known as: SUBOXONE     lisinopriL 10 MG tablet        STOP taking these medications    busPIRone 15 MG tablet  Commonly known as: BUSPAR     cloNIDine 0.1 MG tablet  Commonly known as: CATAPRES     folic acid 1 MG tablet  Commonly known as: FOLVITE     gabapentin 600 MG tablet  Commonly known as: NEURONTIN  Replaced by: gabapentin 300 MG capsule     multivitamin tablet  Commonly known as: THERAGRAN     naltrexone microspheres 380 mg Ssrr kit  Commonly known as: VIVITROL     nicotine 14 mg/24 hr  Commonly known as: NICODERM CQ  Replaced by: nicotine 21 mg/24 hr     OLANZapine 10 MG tablet  Commonly known as: ZyPREXA     thiamine 100 MG tablet           Where to Get Your Medications      These medications were sent to [facility] [address] [address]    Phone: [phone]   · gabapentin 300 MG capsule  · HYDROcodone-acetaminophen  mg per tablet  · nicotine 21 mg/24 hr  · senna-docusate 8.6-50 mg 8.6-50 mg per tablet  · tamsulosin 0.4 mg Cap          Explained in detail to the patient about the discharge plan, medications, and follow-up visits. Pt understands and agrees with the treatment plan  Discharged Condition: stable  Diet: regular  Disposition: home    Medications Per [place] med rec  Activities as tolerated  Follow up with your PCP in 2 wks   For further questions contact hospitalist office    Discharge time 33 minutes    For worsening symptoms, chest pain, shortness of breath, increased abdominal pain, high grade fever, stroke or stroke like symptoms, immediately go to the nearest Emergency Room or call 911 as soon as possible.      Louis Coello M.D on 7/26/2022. at 8:32 AM.

## 2022-07-26 NOTE — PT/OT/SLP PROGRESS
"Physical Therapy      Patient Name:  Pablo Montenegro   MRN:  [id number]    Patient not seen today secondary to patient refused PT citing increased pain and anticipated discharge today. Would like to "take it easy". Will follow-up tomorrow if patient is still in-house.        "

## 2022-07-26 NOTE — NURSING
Pt did not void at 1800 and stated his bladder felt full. Bladder scan showed 673ml. Torres inserted per protocol. No Adverse reactions noted. Pt tolerated procedure well.

## 2022-07-28 ENCOUNTER — PATIENT OUTREACH (OUTPATIENT)
Dept: ADMINISTRATIVE | Facility: CLINIC | Age: 47
End: 2022-07-28
Payer: MEDICAID

## 2022-07-28 NOTE — ANESTHESIA POSTPROCEDURE EVALUATION
Anesthesia Post Evaluation    Patient: Pablo Montenegro    Procedure(s) Performed: Procedure(s) (LRB):  DISCECTOMY, SPINE, MINIMALLY INVASIVE, USING MICROSCOPE (N/A)              Anesthetic complications: no                Vitals Value Taken Time   /71 07/26/22 0727   Temp 36.8 °C (98.2 °F) 07/26/22 0727   Pulse 75 07/26/22 0727   Resp 18 07/26/22 0821   SpO2 99 % 07/26/22 0727         Event Time   Out of Recovery 07/22/2022 08:49:18         Pain/Damon Score: No data recorded

## 2022-07-28 NOTE — PROGRESS NOTES
C3 nurse attempted to contact Pablo Montenegro for a TCC post hospital discharge follow up call. No answer. No voicemail available. The patient has a scheduled HOSFU appointment with Dr. Dubois (neurosurgeon)  on 8/11/22 @ 9:00am.

## 2022-07-29 NOTE — PROGRESS NOTES
C3 nurse spoke with Pablo Montenegro for a TCC post hospital discharge follow up call. The patient has a scheduled [facility] appointment with Dr. Dubois (neurosurgeon) on 8/11/22 @ 9am.

## 2022-08-01 NOTE — ANESTHESIA POSTPROCEDURE EVALUATION
Anesthesia Post Evaluation    Patient: Pablo Montenegro    Procedure(s) Performed: Procedure(s) (LRB):  MRI (MAGNETIC RESONANCE IMAGING) (N/A)    Final Anesthesia Type: general      Patient location during evaluation: PACU  Patient participation: Yes- Able to Participate  Level of consciousness: awake and alert and oriented  Post-procedure vital signs: reviewed and stable  Pain management: adequate  Airway patency: patent    PONV status at discharge: No PONV  Anesthetic complications: no      Cardiovascular status: blood pressure returned to baseline and stable  Respiratory status: unassisted  Hydration status: euvolemic  Follow-up not needed.          Vitals Value Taken Time   /87 07/31/22 2137   Temp 36.7 07/31/22 2137   Pulse 84 07/31/22 2137   Resp 18 07/31/22 2137   SpO2 99% 07/31/22 2137         Event Time   Out of Recovery 15:10:00         Pain/Damon Score: No data recorded

## 2022-08-03 NOTE — PROGRESS NOTES
Chief Complaint: No chief complaint on file.      HPI:  Patient is a47 y.o. male who  has a past medical history of Addiction to drug, Anxiety, Depression, Herniated thoracic disc without myelopathy, and Pancreatitis., HTN; presents to the ED at [facility] with reports of lower back pain and difficulty urinating.  Patient having bowel and bladder dysfunction with retention postop Right L4-5 MIS hemilaminectomy, medial facetectomy, foraminotomy, discectomy using microdissection.  Patient developed urinary retention requiring a Torres catheter. Patient reports for the past 2 weeks he has mild-to-moderate tingling to bilateral lower extremities improvement, patient also notices her recent feelings of bowel sensation, patient would like to wait 2 more weeks before bladder trial of nerve to for wake-up and functioning.   Patient urine stream, hesitancy, straining, interruption of stream, dribbling, frequency, nocturia.           Allergies:  Review of patient's allergies indicates:   Allergen Reactions    Aspirin Palpitations     Per pt makes his heart race       Medications:  Current Outpatient Medications   Medication Sig Dispense Refill    buprenorphine-naloxone 8-2 mg (SUBOXONE) 8-2 mg Place under the tongue.      gabapentin (NEURONTIN) 300 MG capsule Take 1 capsule (300 mg total) by mouth 3 (three) times daily. 90 capsule 11    lisinopriL 10 MG tablet Take 10 mg by mouth once daily at 6am.      nicotine (NICODERM CQ) 21 mg/24 hr Place 1 patch onto the skin once daily. 30 patch 0    senna-docusate 8.6-50 mg (PERICOLACE) 8.6-50 mg per tablet Take 2 tablets by mouth 2 (two) times daily. 120 tablet 0    tamsulosin (FLOMAX) 0.4 mg Cap Take 1 capsule (0.4 mg total) by mouth once daily. 30 capsule 11     No current facility-administered medications for this visit.       Review of Systems:  General: No fever, chills, fatigability, or weight loss.  Skin: No rashes, itching, or changes in color or texture of skin.  Chest:  Denies HANSEN, cyanosis, wheezing, cough, and sputum production.  Abdomen: Appetite fine. No weight loss. Denies diarrhea, abdominal pain, hematemesis, or blood in stool.  Musculoskeletal: No joint stiffness or swelling. Denies back pain.  : As above.  All other review of systems negative.    PMH:  Past Medical History:   Diagnosis Date    Addiction to drug     Anxiety     Depression     per pt denies hx of depression    Herniated thoracic disc without myelopathy     Pancreatitis        PSH:  Past Surgical History:   Procedure Laterality Date    MAGNETIC RESONANCE IMAGING N/A 7/20/2022    Procedure: MRI (MAGNETIC RESONANCE IMAGING);  Surgeon: Tony Trevino DO;  Location: [facility];  Service: Anesthesiology;  Laterality: N/A;  MRI LUMBAR SPINE WITH AND WITHOUT CONTRAST WITH ANESTHESIA    MINIMALLY INVASIVE SURGICAL REMOVAL OF INTERVERTEBRAL DISC OF SPINE USING MICROSCOPE N/A 7/22/2022    Procedure: DISCECTOMY, SPINE, MINIMALLY INVASIVE, USING MICROSCOPE;  Surgeon: Gypsy Dubois MD;  Location: [facility] OR;  Service: Neurosurgery;  Laterality: N/A;  RIGHT   L4-5  MIS DISCECTOMY //  NDM    NECK SURGERY      gland removed at age 13 to r/o cancer       FamHx:  Family History   Problem Relation Age of Onset    Alcohol abuse Paternal Grandfather        SocHx:  Social History     Socioeconomic History    Marital status: Single    Number of children: 2   Occupational History    Occupation: contactor   Tobacco Use    Smoking status: Current Every Day Smoker     Packs/day: 1.00     Years: 1.00     Pack years: 1.00     Types: Cigarettes     Start date: 10/1/2016    Smokeless tobacco: Never Used   Substance and Sexual Activity    Alcohol use: Yes     Comment: occassionaly in a month    Drug use: No     Comment: only tried in the past    Sexual activity: Yes     Partners: Female     Birth control/protection: None   Other Topics Concern    Patient feels they ought to cut down on drinking/drug use No    Patient annoyed  by others criticizing their drinking/drug use No    Patient has felt bad or guilty about drinking/drug use No    Patient has had a drink/used drugs as an eye opener in the AM No       Physical Exam:  There were no vitals filed for this visit.  General: A&Ox3, no apparent distress, no deformities  Neck: No masses, normal thyroid  Lungs: CTA bong, no use of accessory muscles  Heart: RRR, no arrhythmias  Abdomen: Soft, NT, ND, no masses, no hernias, no hepatosplenomegaly  Lymphatic: Neck and groin nodes negative  Skin: The skin is warm and dry. No jaundice.  Ext: No c/c/e.    GUMale: Test desc bong, no abnormalities of epididymus. Penis, with normal penile and scrotal skin. Meatus normal. Normal rectal tone, no hemorrhoids. Prostate size, smooth, soft, nontender, no nodules or masses appreciated. SV not palpable. Perineum and anus normal.          Imaging: MRI- Transitional lumbosacral anatomy with partial sacralization of L5 as described.  Large right central disc extrusion at L4-5 with severe canal stenosis. Cauda equina nerve root enhancement is nonspecific and may be inflammatory. Mild degenerative narrowing of the canal at L2-L3 and L4-5. Multilevel neural foraminal stenoses as described.      Impression:  Urinary retention, neurogenic bladder secondary to disc herniation surgery      Plan: F/U 2 weeks for voiding trial

## 2022-08-05 ENCOUNTER — OFFICE VISIT (OUTPATIENT)
Dept: UROLOGY | Facility: CLINIC | Age: 47
End: 2022-08-05
Payer: MEDICAID

## 2022-08-05 VITALS
OXYGEN SATURATION: 96 % | SYSTOLIC BLOOD PRESSURE: 126 MMHG | RESPIRATION RATE: 18 BRPM | DIASTOLIC BLOOD PRESSURE: 90 MMHG | HEART RATE: 82 BPM | HEIGHT: 71 IN | WEIGHT: 198 LBS | BODY MASS INDEX: 27.72 KG/M2 | TEMPERATURE: 98 F

## 2022-08-05 DIAGNOSIS — N31.9 NEUROGENIC BLADDER: ICD-10-CM

## 2022-08-05 DIAGNOSIS — R33.9 URINARY RETENTION: ICD-10-CM

## 2022-08-05 PROCEDURE — 3080F DIAST BP >= 90 MM HG: CPT | Mod: CPTII,,, | Performed by: NURSE PRACTITIONER

## 2022-08-05 PROCEDURE — 4010F PR ACE/ARB THEARPY RXD/TAKEN: ICD-10-PCS | Mod: CPTII,,, | Performed by: NURSE PRACTITIONER

## 2022-08-05 PROCEDURE — 99214 OFFICE O/P EST MOD 30 MIN: CPT | Mod: PBBFAC | Performed by: NURSE PRACTITIONER

## 2022-08-05 PROCEDURE — 3008F BODY MASS INDEX DOCD: CPT | Mod: CPTII,,, | Performed by: NURSE PRACTITIONER

## 2022-08-05 PROCEDURE — 1111F PR DISCHARGE MEDS RECONCILED W/ CURRENT OUTPATIENT MED LIST: ICD-10-PCS | Mod: CPTII,,, | Performed by: NURSE PRACTITIONER

## 2022-08-05 PROCEDURE — 4010F ACE/ARB THERAPY RXD/TAKEN: CPT | Mod: CPTII,,, | Performed by: NURSE PRACTITIONER

## 2022-08-05 PROCEDURE — 3080F PR MOST RECENT DIASTOLIC BLOOD PRESSURE >= 90 MM HG: ICD-10-PCS | Mod: CPTII,,, | Performed by: NURSE PRACTITIONER

## 2022-08-05 PROCEDURE — 1159F PR MEDICATION LIST DOCUMENTED IN MEDICAL RECORD: ICD-10-PCS | Mod: CPTII,,, | Performed by: NURSE PRACTITIONER

## 2022-08-05 PROCEDURE — 1160F RVW MEDS BY RX/DR IN RCRD: CPT | Mod: CPTII,,, | Performed by: NURSE PRACTITIONER

## 2022-08-05 PROCEDURE — 3008F PR BODY MASS INDEX (BMI) DOCUMENTED: ICD-10-PCS | Mod: CPTII,,, | Performed by: NURSE PRACTITIONER

## 2022-08-05 PROCEDURE — 3074F PR MOST RECENT SYSTOLIC BLOOD PRESSURE < 130 MM HG: ICD-10-PCS | Mod: CPTII,,, | Performed by: NURSE PRACTITIONER

## 2022-08-05 PROCEDURE — 1160F PR REVIEW ALL MEDS BY PRESCRIBER/CLIN PHARMACIST DOCUMENTED: ICD-10-PCS | Mod: CPTII,,, | Performed by: NURSE PRACTITIONER

## 2022-08-05 PROCEDURE — 1111F DSCHRG MED/CURRENT MED MERGE: CPT | Mod: CPTII,,, | Performed by: NURSE PRACTITIONER

## 2022-08-05 PROCEDURE — 99204 PR OFFICE/OUTPT VISIT, NEW, LEVL IV, 45-59 MIN: ICD-10-PCS | Mod: S$PBB,,, | Performed by: NURSE PRACTITIONER

## 2022-08-05 PROCEDURE — 1159F MED LIST DOCD IN RCRD: CPT | Mod: CPTII,,, | Performed by: NURSE PRACTITIONER

## 2022-08-05 PROCEDURE — 3074F SYST BP LT 130 MM HG: CPT | Mod: CPTII,,, | Performed by: NURSE PRACTITIONER

## 2022-08-05 PROCEDURE — 99204 OFFICE O/P NEW MOD 45 MIN: CPT | Mod: S$PBB,,, | Performed by: NURSE PRACTITIONER

## 2022-08-05 NOTE — PATIENT INSTRUCTIONS
Pt seen by Johan FLYNN. Pt will RTC 2 weeks for evaluation. Possible bladder trial at next visit. Pt education given both written and verbal.

## 2022-08-19 ENCOUNTER — OFFICE VISIT (OUTPATIENT)
Dept: UROLOGY | Facility: CLINIC | Age: 47
End: 2022-08-19
Payer: MEDICAID

## 2022-08-19 VITALS
WEIGHT: 194.38 LBS | DIASTOLIC BLOOD PRESSURE: 88 MMHG | TEMPERATURE: 98 F | HEIGHT: 71 IN | HEART RATE: 75 BPM | SYSTOLIC BLOOD PRESSURE: 119 MMHG | BODY MASS INDEX: 27.21 KG/M2 | OXYGEN SATURATION: 97 % | RESPIRATION RATE: 18 BRPM

## 2022-08-19 DIAGNOSIS — N31.9 NEUROGENIC BLADDER: Primary | ICD-10-CM

## 2022-08-19 PROCEDURE — 3074F SYST BP LT 130 MM HG: CPT | Mod: CPTII,,, | Performed by: NURSE PRACTITIONER

## 2022-08-19 PROCEDURE — 1111F PR DISCHARGE MEDS RECONCILED W/ CURRENT OUTPATIENT MED LIST: ICD-10-PCS | Mod: CPTII,,, | Performed by: NURSE PRACTITIONER

## 2022-08-19 PROCEDURE — 4010F PR ACE/ARB THEARPY RXD/TAKEN: ICD-10-PCS | Mod: CPTII,,, | Performed by: NURSE PRACTITIONER

## 2022-08-19 PROCEDURE — 99213 OFFICE O/P EST LOW 20 MIN: CPT | Mod: S$PBB,,, | Performed by: NURSE PRACTITIONER

## 2022-08-19 PROCEDURE — 3079F DIAST BP 80-89 MM HG: CPT | Mod: CPTII,,, | Performed by: NURSE PRACTITIONER

## 2022-08-19 PROCEDURE — 1111F DSCHRG MED/CURRENT MED MERGE: CPT | Mod: CPTII,,, | Performed by: NURSE PRACTITIONER

## 2022-08-19 PROCEDURE — 3079F PR MOST RECENT DIASTOLIC BLOOD PRESSURE 80-89 MM HG: ICD-10-PCS | Mod: CPTII,,, | Performed by: NURSE PRACTITIONER

## 2022-08-19 PROCEDURE — 1159F PR MEDICATION LIST DOCUMENTED IN MEDICAL RECORD: ICD-10-PCS | Mod: CPTII,,, | Performed by: NURSE PRACTITIONER

## 2022-08-19 PROCEDURE — 3008F PR BODY MASS INDEX (BMI) DOCUMENTED: ICD-10-PCS | Mod: CPTII,,, | Performed by: NURSE PRACTITIONER

## 2022-08-19 PROCEDURE — 3008F BODY MASS INDEX DOCD: CPT | Mod: CPTII,,, | Performed by: NURSE PRACTITIONER

## 2022-08-19 PROCEDURE — 1159F MED LIST DOCD IN RCRD: CPT | Mod: CPTII,,, | Performed by: NURSE PRACTITIONER

## 2022-08-19 PROCEDURE — 3074F PR MOST RECENT SYSTOLIC BLOOD PRESSURE < 130 MM HG: ICD-10-PCS | Mod: CPTII,,, | Performed by: NURSE PRACTITIONER

## 2022-08-19 PROCEDURE — 99213 PR OFFICE/OUTPT VISIT, EST, LEVL III, 20-29 MIN: ICD-10-PCS | Mod: S$PBB,,, | Performed by: NURSE PRACTITIONER

## 2022-08-19 PROCEDURE — 99214 OFFICE O/P EST MOD 30 MIN: CPT | Mod: PBBFAC | Performed by: NURSE PRACTITIONER

## 2022-08-19 PROCEDURE — 4010F ACE/ARB THERAPY RXD/TAKEN: CPT | Mod: CPTII,,, | Performed by: NURSE PRACTITIONER

## 2022-08-19 RX ORDER — SULFAMETHOXAZOLE AND TRIMETHOPRIM 800; 160 MG/1; MG/1
1 TABLET ORAL 2 TIMES DAILY
Qty: 14 TABLET | Refills: 0 | Status: SHIPPED | OUTPATIENT
Start: 2022-08-19 | End: 2023-05-10 | Stop reason: ALTCHOICE

## 2022-08-19 NOTE — PROGRESS NOTES
Chief Complaint:   Chief Complaint   Patient presents with    Follow-up     2 week follow up for Urinary retention, neurogenic bladder secondary to disc herniation surgery. Here for possible voiding trial. Last visit 08/05/2022       HPI:  Patient is a 47-year-old male here for a 2 week follow-up for urinary retention neurogenic bladder secondary to a disc herniation surgery.  Patient seen on 08/05/2022 presented to the ED at [facility] with reports of lower back pain and difficulty urinating.  Patient having bowel and bladder dysfunction with retention postop Right L4-5 MIS hemilaminectomy, medial facetectomy, foraminotomy, discectomy using microdissection.  Patient developed urinary retention requiring a Torres catheter. Patient reports for the past 2 weeks he has mild-to-moderate tingling to bilateral lower extremities improvement, patient also noticed recent feelings of bowel sensation.  Today patient presents with increased bowel functioning and feelings in his lower extremities, perineal area it dark in cloudy.  Torres discontinued bladder trial attempted with 200 cc saline, patient failed bladder trial.  Torres catheter placed in patient's bladder.    Allergies:  Review of patient's allergies indicates:   Allergen Reactions    Naproxen sodium      Other reaction(s): fast heart rate    Aspirin Palpitations     Per pt makes his heart race       Medications:  Current Outpatient Medications   Medication Sig Dispense Refill    buprenorphine-naloxone 8-2 mg (SUBOXONE) 8-2 mg Place under the tongue.      gabapentin (NEURONTIN) 300 MG capsule Take 1 capsule (300 mg total) by mouth 3 (three) times daily. 90 capsule 11    lisinopriL 10 MG tablet Take 10 mg by mouth once daily at 6am.      nicotine (NICODERM CQ) 21 mg/24 hr Place 1 patch onto the skin once daily. 30 patch 0    senna-docusate 8.6-50 mg (PERICOLACE) 8.6-50 mg per tablet Take 2 tablets by mouth 2 (two) times daily. 120 tablet 0    tamsulosin (FLOMAX) 0.4 mg  Cap Take 1 capsule (0.4 mg total) by mouth once daily. 30 capsule 11     No current facility-administered medications for this visit.       Review of Systems:  General: No fever, chills, fatigability, or weight loss.  Skin: No rashes, itching, or changes in color or texture of skin.  Chest: Denies HANSEN, cyanosis, wheezing, cough, and sputum production.  Abdomen: Appetite fine. No weight loss. Denies diarrhea, abdominal pain, hematemesis, or blood in stool.  Musculoskeletal: No joint stiffness or swelling. Denies back pain.  : As above.  All other review of systems negative.    PMH:  Past Medical History:   Diagnosis Date    Addiction to drug     Anxiety     Depression     per pt denies hx of depression    Herniated thoracic disc without myelopathy     Pancreatitis        PSH:  Past Surgical History:   Procedure Laterality Date    MAGNETIC RESONANCE IMAGING N/A 7/20/2022    Procedure: MRI (MAGNETIC RESONANCE IMAGING);  Surgeon: Tony Trevino DO;  Location: [facility];  Service: Anesthesiology;  Laterality: N/A;  MRI LUMBAR SPINE WITH AND WITHOUT CONTRAST WITH ANESTHESIA    MINIMALLY INVASIVE SURGICAL REMOVAL OF INTERVERTEBRAL DISC OF SPINE USING MICROSCOPE N/A 7/22/2022    Procedure: DISCECTOMY, SPINE, MINIMALLY INVASIVE, USING MICROSCOPE;  Surgeon: Gypsy Dubois MD;  Location: [facility] OR;  Service: Neurosurgery;  Laterality: N/A;  RIGHT   L4-5  MIS DISCECTOMY //  NDM    NECK SURGERY      gland removed at age 13 to r/o cancer       FamHx:  Family History   Problem Relation Age of Onset    Alcohol abuse Paternal Grandfather        SocHx:  Social History     Socioeconomic History    Marital status: Single    Number of children: 2   Occupational History    Occupation: contactor   Tobacco Use    Smoking status: Current Every Day Smoker     Packs/day: 1.00     Years: 1.00     Pack years: 1.00     Types: Cigarettes     Start date: 10/1/2016    Smokeless tobacco: Never Used   Substance and Sexual Activity     Alcohol use: Yes     Comment: occassionaly in a month    Drug use: No     Comment: only tried in the past    Sexual activity: Yes     Partners: Female     Birth control/protection: None   Other Topics Concern    Patient feels they ought to cut down on drinking/drug use No    Patient annoyed by others criticizing their drinking/drug use No    Patient has felt bad or guilty about drinking/drug use No    Patient has had a drink/used drugs as an eye opener in the AM No       Physical Exam:  Vitals:    08/19/22 0901   BP: 119/88   Pulse: 75   Resp: 18   Temp: 98.1 °F (36.7 °C)     General: A&Ox3, no apparent distress, no deformities  Neck: No masses, normal thyroid  Lungs: CTA bong, no use of accessory muscles  Heart: RRR, no arrhythmias  Abdomen: Soft, NT, ND, no masses, no hernias, no hepatosplenomegaly  Lymphatic: Neck and groin nodes negative  Skin: The skin is warm and dry. No jaundice.  Ext: No c/c/e.          Impression:  Urinary retention, neurogenic bladder      Plan:  Start Bactrim DS 1 tab p.o. b.i.d. x5 days, Torres catheter in place x1 month RTC for bladder trial.  Encouraged patient to maintain good bladder hygiene, avoid acidic drinks and foods caffeinated drinks, in a drinks any bladder irritants.

## 2022-08-19 NOTE — PATIENT INSTRUCTIONS
Pt seen by Johan FLYNN. Catheter removed and Bladder trial started. Bladder trial done and  200mL placed into bladder. Residual 0mL from trial. Catheter placed back. Aseptic technique used to deflate balloon from catheter. Cleansed with betadine around area. Placed # 16 Fr. Draining urine. RTC one month for bladder trial. Pt education given both written and verbal.

## 2022-09-23 ENCOUNTER — OFFICE VISIT (OUTPATIENT)
Dept: UROLOGY | Facility: CLINIC | Age: 47
End: 2022-09-23
Payer: MEDICAID

## 2022-09-23 VITALS
HEIGHT: 71 IN | SYSTOLIC BLOOD PRESSURE: 124 MMHG | RESPIRATION RATE: 18 BRPM | WEIGHT: 185.19 LBS | TEMPERATURE: 98 F | OXYGEN SATURATION: 97 % | DIASTOLIC BLOOD PRESSURE: 89 MMHG | BODY MASS INDEX: 25.93 KG/M2 | HEART RATE: 87 BPM

## 2022-09-23 DIAGNOSIS — N31.9 NEUROGENIC BLADDER: Primary | ICD-10-CM

## 2022-09-23 PROCEDURE — 1160F RVW MEDS BY RX/DR IN RCRD: CPT | Mod: CPTII,,, | Performed by: NURSE PRACTITIONER

## 2022-09-23 PROCEDURE — 3008F BODY MASS INDEX DOCD: CPT | Mod: CPTII,,, | Performed by: NURSE PRACTITIONER

## 2022-09-23 PROCEDURE — 1159F PR MEDICATION LIST DOCUMENTED IN MEDICAL RECORD: ICD-10-PCS | Mod: CPTII,,, | Performed by: NURSE PRACTITIONER

## 2022-09-23 PROCEDURE — 99213 OFFICE O/P EST LOW 20 MIN: CPT | Mod: PBBFAC | Performed by: NURSE PRACTITIONER

## 2022-09-23 PROCEDURE — 1159F MED LIST DOCD IN RCRD: CPT | Mod: CPTII,,, | Performed by: NURSE PRACTITIONER

## 2022-09-23 PROCEDURE — 3079F DIAST BP 80-89 MM HG: CPT | Mod: CPTII,,, | Performed by: NURSE PRACTITIONER

## 2022-09-23 PROCEDURE — 3074F PR MOST RECENT SYSTOLIC BLOOD PRESSURE < 130 MM HG: ICD-10-PCS | Mod: CPTII,,, | Performed by: NURSE PRACTITIONER

## 2022-09-23 PROCEDURE — 99213 PR OFFICE/OUTPT VISIT, EST, LEVL III, 20-29 MIN: ICD-10-PCS | Mod: S$PBB,,, | Performed by: NURSE PRACTITIONER

## 2022-09-23 PROCEDURE — 4010F PR ACE/ARB THEARPY RXD/TAKEN: ICD-10-PCS | Mod: CPTII,,, | Performed by: NURSE PRACTITIONER

## 2022-09-23 PROCEDURE — 3008F PR BODY MASS INDEX (BMI) DOCUMENTED: ICD-10-PCS | Mod: CPTII,,, | Performed by: NURSE PRACTITIONER

## 2022-09-23 PROCEDURE — 1160F PR REVIEW ALL MEDS BY PRESCRIBER/CLIN PHARMACIST DOCUMENTED: ICD-10-PCS | Mod: CPTII,,, | Performed by: NURSE PRACTITIONER

## 2022-09-23 PROCEDURE — 3074F SYST BP LT 130 MM HG: CPT | Mod: CPTII,,, | Performed by: NURSE PRACTITIONER

## 2022-09-23 PROCEDURE — 3079F PR MOST RECENT DIASTOLIC BLOOD PRESSURE 80-89 MM HG: ICD-10-PCS | Mod: CPTII,,, | Performed by: NURSE PRACTITIONER

## 2022-09-23 PROCEDURE — 4010F ACE/ARB THERAPY RXD/TAKEN: CPT | Mod: CPTII,,, | Performed by: NURSE PRACTITIONER

## 2022-09-23 PROCEDURE — 99213 OFFICE O/P EST LOW 20 MIN: CPT | Mod: S$PBB,,, | Performed by: NURSE PRACTITIONER

## 2022-09-23 NOTE — PROGRESS NOTES
Chief Complaint: No chief complaint on file.      HPI:  Patient is a 47-year-old male here for a 1 month follow-up for urinary retention neurogenic bladder secondary to a disc herniation surgery.  Patient seen on 08/05/2022 presented to the ED at [facility] with reports of lower back pain and difficulty urinating.  Patient having bowel and bladder dysfunction with retention postop Right L4-5 MIS hemilaminectomy, medial facetectomy, foraminotomy, discectomy using microdissection.  Patient developed urinary retention requiring a Torres catheter. Patient reports for the past 2 weeks he has mild-to-moderate tingling to bilateral lower extremities improvement, patient also noticed recent feelings of bowel sensation.  Today patient presents with increased bowel functioning and feelings in his lower extremities, perineal area it dark in cloudy.  Torres discontinued bladder trial attempted with 200 cc saline, patient failed bladder trial.  Torres catheter replaced in patient's bladder. Treated with Bactrim for UTI positive Culture. Today patient still having issues with numbness pelvic region does not have full sensation continue with Torres catheter in she monthly until full urinary sensation returns.    Allergies:  Review of patient's allergies indicates:   Allergen Reactions    Naproxen sodium      Other reaction(s): fast heart rate    Aspirin Palpitations     Per pt makes his heart race       Medications:  Current Outpatient Medications   Medication Sig Dispense Refill    buprenorphine-naloxone 8-2 mg (SUBOXONE) 8-2 mg Place under the tongue.      gabapentin (NEURONTIN) 300 MG capsule Take 1 capsule (300 mg total) by mouth 3 (three) times daily. 90 capsule 11    lisinopriL 10 MG tablet Take 10 mg by mouth once daily at 6am.      sulfamethoxazole-trimethoprim 800-160mg (BACTRIM DS) 800-160 mg Tab Take 1 tablet by mouth 2 (two) times daily. 14 tablet 0    tamsulosin (FLOMAX) 0.4 mg Cap Take 1 capsule (0.4 mg total) by mouth once  daily. 30 capsule 11     No current facility-administered medications for this visit.       Review of Systems:  General: No fever, chills, fatigability, or weight loss.  Skin: No rashes, itching, or changes in color or texture of skin.  Chest: Denies HANSEN, cyanosis, wheezing, cough, and sputum production.  Abdomen: Appetite fine. No weight loss. Denies diarrhea, abdominal pain, hematemesis, or blood in stool.  Musculoskeletal: No joint stiffness or swelling. Denies back pain.  : As above.  All other review of systems negative.    PMH:  Past Medical History:   Diagnosis Date    Addiction to drug     Anxiety     Depression     per pt denies hx of depression    Herniated thoracic disc without myelopathy     Pancreatitis        PSH:  Past Surgical History:   Procedure Laterality Date    MAGNETIC RESONANCE IMAGING N/A 7/20/2022    Procedure: MRI (MAGNETIC RESONANCE IMAGING);  Surgeon: Tony Trevino DO;  Location: [facility];  Service: Anesthesiology;  Laterality: N/A;  MRI LUMBAR SPINE WITH AND WITHOUT CONTRAST WITH ANESTHESIA    MINIMALLY INVASIVE SURGICAL REMOVAL OF INTERVERTEBRAL DISC OF SPINE USING MICROSCOPE N/A 7/22/2022    Procedure: DISCECTOMY, SPINE, MINIMALLY INVASIVE, USING MICROSCOPE;  Surgeon: Gypsy Dubois MD;  Location: [facility];  Service: Neurosurgery;  Laterality: N/A;  RIGHT   L4-5  MIS DISCECTOMY //  NDM    NECK SURGERY      gland removed at age 13 to r/o cancer       FamHx:  Family History   Problem Relation Age of Onset    Alcohol abuse Paternal Grandfather        SocHx:  Social History     Socioeconomic History    Marital status: Single    Number of children: 2   Occupational History    Occupation: contactor   Tobacco Use    Smoking status: Every Day     Packs/day: 1.00     Years: 1.00     Pack years: 1.00     Types: Cigarettes     Start date: 10/1/2016    Smokeless tobacco: Never   Substance and Sexual Activity    Alcohol use: Yes     Comment: occassionaly in a month    Drug use: No     Comment: only  tried in the past    Sexual activity: Yes     Partners: Female     Birth control/protection: None   Other Topics Concern    Patient feels they ought to cut down on drinking/drug use No    Patient annoyed by others criticizing their drinking/drug use No    Patient has felt bad or guilty about drinking/drug use No    Patient has had a drink/used drugs as an eye opener in the AM No       Physical Exam:  There were no vitals filed for this visit.  General: A&Ox3, no apparent distress, no deformities  Neck: No masses, normal thyroid  Lungs: CTA bong, no use of accessory muscles  Heart: RRR, no arrhythmias  Abdomen: Soft, NT, ND, no masses, no hernias, no hepatosplenomegaly  Lymphatic: Neck and groin nodes negative  Skin: The skin is warm and dry. No jaundice.  Ext: No c/c/e.        Labs:       Impression: Neurogenic Bladder      Plan: UA now due to patient persistent neurogenic bladder and unable to pass voiding trial will be medical necessary for self catheterization 5 times a day until urinary retention resolved.  This is dated on 04/04/2023.

## 2022-09-23 NOTE — PROGRESS NOTES
Pt seen by Johan FLYNN. Bladder trial not performed at visit. Instructed to do rodriguez cath chagne. Aseptic technique used to deflate balloon from catheter. Cleansed with betadine around area. Placed # 16  Fr. Placed  10 ml in catheter. Draining  urine. Secured with stat lock to right leg. RTC in 1 month for cath change. Pt education given both written and verbal.

## 2022-10-20 ENCOUNTER — CLINICAL SUPPORT (OUTPATIENT)
Dept: UROLOGY | Facility: CLINIC | Age: 47
End: 2022-10-20
Payer: MEDICAID

## 2022-10-20 DIAGNOSIS — N31.9 NEUROGENIC BLADDER: Primary | Chronic | ICD-10-CM

## 2022-10-20 NOTE — PROGRESS NOTES
Torres catheter discontinued and new Torres was reinserted without difficulty.  Patient instructed to RTC in one month for Torres catheter change..

## 2022-11-18 ENCOUNTER — HOSPITAL ENCOUNTER (EMERGENCY)
Facility: HOSPITAL | Age: 47
Discharge: PSYCHIATRIC OR SUBSTANCE USE FACILITY | End: 2022-11-18
Attending: FAMILY MEDICINE
Payer: MEDICAID

## 2022-11-18 VITALS
WEIGHT: 185 LBS | HEIGHT: 71 IN | OXYGEN SATURATION: 99 % | HEART RATE: 73 BPM | SYSTOLIC BLOOD PRESSURE: 123 MMHG | TEMPERATURE: 99 F | DIASTOLIC BLOOD PRESSURE: 78 MMHG | BODY MASS INDEX: 25.9 KG/M2 | RESPIRATION RATE: 16 BRPM

## 2022-11-18 DIAGNOSIS — T83.511A URINARY TRACT INFECTION ASSOCIATED WITH INDWELLING URETHRAL CATHETER, INITIAL ENCOUNTER: ICD-10-CM

## 2022-11-18 DIAGNOSIS — N39.0 URINARY TRACT INFECTION ASSOCIATED WITH INDWELLING URETHRAL CATHETER, INITIAL ENCOUNTER: ICD-10-CM

## 2022-11-18 DIAGNOSIS — R44.0 AUDITORY HALLUCINATION: ICD-10-CM

## 2022-11-18 DIAGNOSIS — Z00.8 MEDICAL CLEARANCE FOR PSYCHIATRIC ADMISSION: Primary | ICD-10-CM

## 2022-11-18 LAB
ALBUMIN SERPL-MCNC: 4 GM/DL (ref 3.5–5)
ALBUMIN/GLOB SERPL: 1.3 RATIO (ref 1.1–2)
ALP SERPL-CCNC: 62 UNIT/L (ref 40–150)
ALT SERPL-CCNC: 21 UNIT/L (ref 0–55)
AMPHET UR QL SCN: NEGATIVE
APAP SERPL-MCNC: <17.4 UG/ML (ref 17.4–30)
APPEARANCE UR: ABNORMAL
AST SERPL-CCNC: 22 UNIT/L (ref 5–34)
BACTERIA #/AREA URNS AUTO: ABNORMAL /HPF
BARBITURATE SCN PRESENT UR: NEGATIVE
BASOPHILS # BLD AUTO: 0.02 X10(3)/MCL (ref 0–0.2)
BASOPHILS NFR BLD AUTO: 0.3 %
BENZODIAZ UR QL SCN: NEGATIVE
BILIRUB UR QL STRIP.AUTO: NEGATIVE MG/DL
BILIRUBIN DIRECT+TOT PNL SERPL-MCNC: 0.5 MG/DL
BUN SERPL-MCNC: 11.8 MG/DL (ref 8.9–20.6)
CALCIUM SERPL-MCNC: 9.5 MG/DL (ref 8.4–10.2)
CANNABINOIDS UR QL SCN: NEGATIVE
CHLORIDE SERPL-SCNC: 105 MMOL/L (ref 98–107)
CO2 SERPL-SCNC: 29 MMOL/L (ref 22–29)
COCAINE UR QL SCN: NEGATIVE
COLOR UR AUTO: YELLOW
CREAT SERPL-MCNC: 0.92 MG/DL (ref 0.73–1.18)
EOSINOPHIL # BLD AUTO: 0.12 X10(3)/MCL (ref 0–0.9)
EOSINOPHIL NFR BLD AUTO: 1.6 %
ERYTHROCYTE [DISTWIDTH] IN BLOOD BY AUTOMATED COUNT: 13.9 % (ref 11.5–17)
ETHANOL SERPL-MCNC: <10 MG/DL
FLUAV AG UPPER RESP QL IA.RAPID: NOT DETECTED
FLUBV AG UPPER RESP QL IA.RAPID: NOT DETECTED
GFR SERPLBLD CREATININE-BSD FMLA CKD-EPI: >60 MLS/MIN/1.73/M2
GLOBULIN SER-MCNC: 3.1 GM/DL (ref 2.4–3.5)
GLUCOSE SERPL-MCNC: 93 MG/DL (ref 74–100)
GLUCOSE UR QL STRIP.AUTO: NEGATIVE MG/DL
HCT VFR BLD AUTO: 49.5 % (ref 42–52)
HGB BLD-MCNC: 16.1 GM/DL (ref 14–18)
IMM GRANULOCYTES # BLD AUTO: 0.03 X10(3)/MCL (ref 0–0.04)
IMM GRANULOCYTES NFR BLD AUTO: 0.4 %
KETONES UR QL STRIP.AUTO: NEGATIVE MG/DL
LEUKOCYTE ESTERASE UR QL STRIP.AUTO: ABNORMAL UNIT/L
LYMPHOCYTES # BLD AUTO: 2.27 X10(3)/MCL (ref 0.6–4.6)
LYMPHOCYTES NFR BLD AUTO: 30.3 %
MCH RBC QN AUTO: 31.4 PG (ref 27–31)
MCHC RBC AUTO-ENTMCNC: 32.5 MG/DL (ref 33–36)
MCV RBC AUTO: 96.7 FL (ref 80–94)
MDMA UR QL SCN: NEGATIVE
MONOCYTES # BLD AUTO: 0.55 X10(3)/MCL (ref 0.1–1.3)
MONOCYTES NFR BLD AUTO: 7.3 %
MUCOUS THREADS URNS QL MICRO: ABNORMAL /LPF
NEUTROPHILS # BLD AUTO: 4.5 X10(3)/MCL (ref 2.1–9.2)
NEUTROPHILS NFR BLD AUTO: 60.1 %
NITRITE UR QL STRIP.AUTO: POSITIVE
NRBC BLD AUTO-RTO: 0 %
OPIATES UR QL SCN: NEGATIVE
PCP UR QL: NEGATIVE
PH UR STRIP.AUTO: 6 [PH]
PH UR: 6 [PH] (ref 3–11)
PLATELET # BLD AUTO: 191 X10(3)/MCL (ref 130–400)
PMV BLD AUTO: 11.2 FL (ref 7.4–10.4)
POTASSIUM SERPL-SCNC: 3.6 MMOL/L (ref 3.5–5.1)
PROT SERPL-MCNC: 7.1 GM/DL (ref 6.4–8.3)
PROT UR QL STRIP.AUTO: 100 MG/DL
RBC # BLD AUTO: 5.12 X10(6)/MCL (ref 4.7–6.1)
RBC #/AREA URNS AUTO: ABNORMAL /HPF
RBC UR QL AUTO: ABNORMAL UNIT/L
SALICYLATES SERPL-MCNC: <5 MG/DL
SARS-COV-2 RNA RESP QL NAA+PROBE: NOT DETECTED
SODIUM SERPL-SCNC: 142 MMOL/L (ref 136–145)
SP GR UR STRIP.AUTO: >=1.03
SPECIFIC GRAVITY, URINE AUTO (.000) (OHS): >=1.03 (ref 1–1.03)
SQUAMOUS #/AREA URNS AUTO: ABNORMAL /HPF
TSH SERPL-ACNC: 1.52 UIU/ML (ref 0.35–4.94)
UROBILINOGEN UR STRIP-ACNC: 2 MG/DL
WBC # SPEC AUTO: 7.5 X10(3)/MCL (ref 4.5–11.5)
WBC #/AREA URNS AUTO: ABNORMAL /HPF

## 2022-11-18 PROCEDURE — 80053 COMPREHEN METABOLIC PANEL: CPT | Performed by: FAMILY MEDICINE

## 2022-11-18 PROCEDURE — 85025 COMPLETE CBC W/AUTO DIFF WBC: CPT | Performed by: FAMILY MEDICINE

## 2022-11-18 PROCEDURE — 99285 EMERGENCY DEPT VISIT HI MDM: CPT | Mod: 25

## 2022-11-18 PROCEDURE — 63600175 PHARM REV CODE 636 W HCPCS: Performed by: FAMILY MEDICINE

## 2022-11-18 PROCEDURE — S4991 NICOTINE PATCH NONLEGEND: HCPCS | Performed by: FAMILY MEDICINE

## 2022-11-18 PROCEDURE — 96372 THER/PROPH/DIAG INJ SC/IM: CPT | Performed by: FAMILY MEDICINE

## 2022-11-18 PROCEDURE — 0240U COVID/FLU A&B PCR: CPT | Performed by: FAMILY MEDICINE

## 2022-11-18 PROCEDURE — 82077 ASSAY SPEC XCP UR&BREATH IA: CPT | Performed by: FAMILY MEDICINE

## 2022-11-18 PROCEDURE — 80143 DRUG ASSAY ACETAMINOPHEN: CPT | Performed by: FAMILY MEDICINE

## 2022-11-18 PROCEDURE — 81001 URINALYSIS AUTO W/SCOPE: CPT | Performed by: FAMILY MEDICINE

## 2022-11-18 PROCEDURE — 25000003 PHARM REV CODE 250: Performed by: FAMILY MEDICINE

## 2022-11-18 PROCEDURE — 84443 ASSAY THYROID STIM HORMONE: CPT | Performed by: FAMILY MEDICINE

## 2022-11-18 PROCEDURE — 80307 DRUG TEST PRSMV CHEM ANLYZR: CPT | Performed by: FAMILY MEDICINE

## 2022-11-18 PROCEDURE — 81003 URINALYSIS AUTO W/O SCOPE: CPT | Performed by: FAMILY MEDICINE

## 2022-11-18 PROCEDURE — 80179 DRUG ASSAY SALICYLATE: CPT | Performed by: FAMILY MEDICINE

## 2022-11-18 RX ORDER — CEFUROXIME AXETIL 500 MG/1
500 TABLET ORAL EVERY 12 HOURS
Qty: 20 TABLET | Refills: 0 | Status: SHIPPED | OUTPATIENT
Start: 2022-11-18 | End: 2022-11-28

## 2022-11-18 RX ORDER — QUETIAPINE FUMARATE 25 MG/1
50 TABLET, FILM COATED ORAL
Status: COMPLETED | OUTPATIENT
Start: 2022-11-18 | End: 2022-11-18

## 2022-11-18 RX ORDER — IBUPROFEN 200 MG
1 TABLET ORAL DAILY
Status: DISCONTINUED | OUTPATIENT
Start: 2022-11-19 | End: 2022-11-19 | Stop reason: HOSPADM

## 2022-11-18 RX ORDER — IBUPROFEN 200 MG
1 TABLET ORAL
Status: CANCELLED | OUTPATIENT
Start: 2022-11-18 | End: 2022-11-18

## 2022-11-18 RX ORDER — IBUPROFEN 200 MG
1 TABLET ORAL ONCE
Status: COMPLETED | OUTPATIENT
Start: 2022-11-18 | End: 2022-11-18

## 2022-11-18 RX ORDER — CEFTRIAXONE 1 G/1
1 INJECTION, POWDER, FOR SOLUTION INTRAMUSCULAR; INTRAVENOUS
Status: COMPLETED | OUTPATIENT
Start: 2022-11-18 | End: 2022-11-18

## 2022-11-18 RX ADMIN — NICOTINE 1 PATCH: 14 PATCH TRANSDERMAL at 09:11

## 2022-11-18 RX ADMIN — CEFTRIAXONE SODIUM 1 G: 1 INJECTION, POWDER, FOR SOLUTION INTRAMUSCULAR; INTRAVENOUS at 09:11

## 2022-11-18 RX ADMIN — QUETIAPINE 50 MG: 25 TABLET ORAL at 09:11

## 2022-11-19 NOTE — ED TRIAGE NOTES
Pt states that he has been hallucination and hearing things for a while and has not been taking medications as directed, except for sleep medication. Pt presents with father and stays at home with father

## 2022-11-19 NOTE — ED PROVIDER NOTES
Encounter Date: 11/18/2022       History     Chief Complaint   Patient presents with    Psychiatric Evaluation     Pt states that he has been hallucination and hearing things for a while and has not been taking medications as directed, except for sleep medication. Pt presents with father and stays at home with father     47-year-old with history of anxiety, urinary retention/neurogenic bladder with indwelling Torres catheter in place, depression, schizophrenia presents with intermittent auditory hallucinations over the past 2 weeks.  Patient states the hallucinations are making him paranoid and he is walking around with a hammer.  He denies visual hallucinations.  Denies suicidal or homicidal ideation.  Patient was previously hospitalized at a psychiatric facility in [place] and would like to return to this facility.  No physical complaints.    Review of patient's allergies indicates:   Allergen Reactions    Naproxen sodium      Other reaction(s): fast heart rate    Aspirin Palpitations     Per pt makes his heart race     Past Medical History:   Diagnosis Date    Addiction to drug     Anxiety     Depression     per pt denies hx of depression    Herniated thoracic disc without myelopathy     Pancreatitis      Past Surgical History:   Procedure Laterality Date    MAGNETIC RESONANCE IMAGING N/A 7/20/2022    Procedure: MRI (MAGNETIC RESONANCE IMAGING);  Surgeon: Tony Trevino DO;  Location: [facility];  Service: Anesthesiology;  Laterality: N/A;  MRI LUMBAR SPINE WITH AND WITHOUT CONTRAST WITH ANESTHESIA    MINIMALLY INVASIVE SURGICAL REMOVAL OF INTERVERTEBRAL DISC OF SPINE USING MICROSCOPE N/A 7/22/2022    Procedure: DISCECTOMY, SPINE, MINIMALLY INVASIVE, USING MICROSCOPE;  Surgeon: Gypsy Dubois MD;  Location: [facility];  Service: Neurosurgery;  Laterality: N/A;  RIGHT   L4-5  MIS DISCECTOMY //  NDM    NECK SURGERY      gland removed at age 13 to r/o cancer     Family History   Problem Relation Age of Onset    Alcohol abuse  Paternal Grandfather      Social History     Tobacco Use    Smoking status: Every Day     Packs/day: 1.00     Years: 1.00     Pack years: 1.00     Types: Cigarettes     Start date: 10/1/2016    Smokeless tobacco: Never   Substance Use Topics    Alcohol use: Yes     Comment: occassionaly in a month    Drug use: No     Comment: only tried in the past     Review of Systems   Constitutional: Negative.    HENT: Negative.     Eyes: Negative.    Respiratory: Negative.     Cardiovascular: Negative.    Gastrointestinal: Negative.    Endocrine: Negative.    Genitourinary: Negative.    Musculoskeletal: Negative.    Skin: Negative.    Allergic/Immunologic: Negative.    Neurological: Negative.    Hematological: Negative.    Psychiatric/Behavioral:  Positive for hallucinations.      Physical Exam     Initial Vitals [11/18/22 1815]   BP Pulse Resp Temp SpO2   128/85 69 18 99.3 °F (37.4 °C) 100 %      MAP       --         Physical Exam    Nursing note and vitals reviewed.  Constitutional: He appears well-developed and well-nourished.   HENT:   Head: Normocephalic and atraumatic.   Eyes: Conjunctivae and EOM are normal. Pupils are equal, round, and reactive to light.   Neck: Neck supple.   Normal range of motion.  Cardiovascular:  Normal rate and regular rhythm.           Pulmonary/Chest: Breath sounds normal.   Abdominal: Abdomen is soft. Bowel sounds are normal.   Genitourinary:    Genitourinary Comments: Indwelling Torres in place.     Musculoskeletal:         General: Normal range of motion.      Cervical back: Normal range of motion and neck supple.     Neurological: He is alert and oriented to person, place, and time.   Skin: Skin is warm. Capillary refill takes less than 2 seconds.   Psychiatric:   Auditory hallucinations       ED Course   Procedures  Labs Reviewed   URINALYSIS, REFLEX TO URINE CULTURE - Abnormal; Notable for the following components:       Result Value    Appearance, UA Hazy (*)     Protein,  (*)      Blood, UA Large (*)     Urobilinogen, UA 2.0 (*)     Nitrites, UA Positive (*)     Leukocyte Esterase, UA Trace (*)     All other components within normal limits   ACETAMINOPHEN LEVEL - Abnormal; Notable for the following components:    Acetaminophen Level <17.4 (*)     All other components within normal limits   CBC WITH DIFFERENTIAL - Abnormal; Notable for the following components:    MCV 96.7 (*)     MCH 31.4 (*)     MCHC 32.5 (*)     MPV 11.2 (*)     All other components within normal limits   URINALYSIS, MICROSCOPIC - Abnormal; Notable for the following components:    Bacteria, UA Few (*)     Mucous, UA Small (*)     RBC, UA 21-50 (*)     Squamous Epithelial Cells, UA Few (*)     All other components within normal limits   TSH - Normal   DRUG SCREEN, URINE (BEAKER) - Normal    Narrative:     Cut off concentrations:    Amphetamines - 1000 ng/ml  Barbiturates - 200 ng/ml  Benzodiazepine - 200 ng/ml  Cannabinoids (THC) - 50 ng/ml  Cocaine - 300 ng/ml  Fentanyl - 1.0 ng/ml  MDMA - 500 ng/ml  Opiates - 300 ng/ml   Phencyclidine (PCP) - 25 ng/ml    Specimen submitted for drug analysis and tested for pH and specific gravity in order to evaluate sample integrity. Suspect tampering if specific gravity is <1.003 and/or pH is not within the range of 4.5 - 8.0  False negatives may result form substances such as bleach added to urine.  False positives may result for the presence of a substance with similar chemical structure to the drug or its metabolite.    This test provides only a PRELIMINARY analytical test result. A more specific alternate chemical method must be used in order to obtain a confirmed analytical result. Gas chromatography/mass spectrometry (GC/MS) is the preferred confirmatory method. Other chemical confirmation methods are available. Clinical consideration and professional judgement should be applied to any drug of abuse test result, particularly when preliminary positive results are used.    Positive  results will be confirmed only at the physicians request. Unconfirmed screening results are to be used only for medical purposes (treatment).        ALCOHOL,MEDICAL (ETHANOL) - Normal   COVID/FLU A&B PCR - Normal    Narrative:     The Xpert Xpress SARS-CoV-2/FLU/RSV plus is a rapid, multiplexed real-time PCR test intended for the simultaneous qualitative detection and differentiation of SARS-CoV-2, Influenza A, Influenza B, and respiratory syncytial virus (RSV) viral RNA in either nasopharyngeal swab or nasal swab specimens.         CBC W/ AUTO DIFFERENTIAL    Narrative:     The following orders were created for panel order CBC auto differential.  Procedure                               Abnormality         Status                     ---------                               -----------         ------                     CBC with Differential[[id number]]        Abnormal            Final result                 Please view results for these tests on the individual orders.   COMPREHENSIVE METABOLIC PANEL   SALICYLATE LEVEL          Imaging Results    None          Medications   nicotine 14 mg/24 hr 1 patch (has no administration in time range)   cefTRIAXone injection 1 g (has no administration in time range)   nicotine 14 mg/24 hr 1 patch (1 patch Transdermal Patch Applied 11/18/22 2105)   QUEtiapine tablet 50 mg (50 mg Oral Given 11/18/22 2118)     Medical Decision Making:   Clinical Tests:   Lab Tests: Ordered and Reviewed  ED Management:  Torres catheter was changed by nurse. Patient treated with IM Rocephin in the ED.  Reviewed previous urine cultures which so sensitivity to Ceftin.  Prescription printed out and placed on the patient's chart.                Medically cleared for psychiatry placement: 11/18/2022  9:31 PM         Clinical Impression:   Final diagnoses:  [Z00.8] Medical clearance for psychiatric admission (Primary)  [R44.0] Auditory hallucination  [T83.511A, N39.0] Urinary tract infection associated with  indwelling urethral catheter, initial encounter      ED Disposition Condition    Transfer to [facility] Facility Stable          ED Prescriptions       Medication Sig Dispense Start Date End Date Auth. Provider    cefUROXime (CEFTIN) 500 MG tablet Take 1 tablet (500 mg total) by mouth every 12 (twelve) hours. for 10 days 20 tablet 11/18/2022 11/28/2022 Cody Summers MD          Follow-up Information       Follow up With Specialties Details Why Contact Info    Lio Patel MD Family Medicine Call   [address] [facility] [address] [phone]               Cody Summers MD  11/18/22 2132       Cody Summers MD  11/18/22 2132

## 2023-01-04 ENCOUNTER — CLINICAL SUPPORT (OUTPATIENT)
Dept: UROLOGY | Facility: CLINIC | Age: 48
End: 2023-01-04
Payer: MEDICAID

## 2023-01-04 DIAGNOSIS — R33.9 URINARY RETENTION: ICD-10-CM

## 2023-01-04 DIAGNOSIS — N31.9 NEUROGENIC BLADDER: Primary | ICD-10-CM

## 2023-01-04 PROCEDURE — 99211 OFF/OP EST MAY X REQ PHY/QHP: CPT | Mod: PBBFAC

## 2023-01-04 NOTE — PROGRESS NOTES
Bladder trial started at 0945 w/200 mls of sterile water inserted into bladder; Rodriguez catheter discontinued; Pt given 1 hour to urinate & unable to do so; 16F rodriguez catheter reinserted w/no complications; 400 mls of clear urine drained into bag; Leg bag connected; Pt instructed to return to clinic in 1 month for catheter change; Discharge paperwork given w/pt verbalizing understanding

## 2023-02-08 ENCOUNTER — CLINICAL SUPPORT (OUTPATIENT)
Dept: UROLOGY | Facility: CLINIC | Age: 48
End: 2023-02-08
Payer: MEDICAID

## 2023-02-08 DIAGNOSIS — N31.9 NEUROGENIC BLADDER: Primary | ICD-10-CM

## 2023-02-08 PROCEDURE — 99211 OFF/OP EST MAY X REQ PHY/QHP: CPT | Mod: PBBFAC

## 2023-02-08 NOTE — PROGRESS NOTES
Presented to clinic for catheter change.  16FR rodriguez catheter discontinued with 200 clear yellow urine noted.  16FR rodriguez catheter inserted using sterile technique.  Tolerated well.  RTC 1 month as scheduled.

## 2023-03-28 ENCOUNTER — CLINICAL SUPPORT (OUTPATIENT)
Dept: UROLOGY | Facility: CLINIC | Age: 48
End: 2023-03-28
Payer: MEDICAID

## 2023-03-28 PROCEDURE — 99211 OFF/OP EST MAY X REQ PHY/QHP: CPT | Mod: PBBFAC

## 2023-05-10 ENCOUNTER — HOSPITAL ENCOUNTER (EMERGENCY)
Facility: HOSPITAL | Age: 48
Discharge: HOME | End: 2023-05-10
Attending: STUDENT IN AN ORGANIZED HEALTH CARE EDUCATION/TRAINING PROGRAM
Payer: MEDICAID

## 2023-05-10 VITALS
SYSTOLIC BLOOD PRESSURE: 129 MMHG | HEIGHT: 71 IN | BODY MASS INDEX: 25.2 KG/M2 | DIASTOLIC BLOOD PRESSURE: 92 MMHG | WEIGHT: 180 LBS | OXYGEN SATURATION: 98 % | HEART RATE: 80 BPM | RESPIRATION RATE: 18 BRPM | TEMPERATURE: 97 F

## 2023-05-10 DIAGNOSIS — T83.511A URINARY TRACT INFECTION ASSOCIATED WITH CATHETERIZATION OF URINARY TRACT, UNSPECIFIED INDWELLING URINARY CATHETER TYPE, INITIAL ENCOUNTER: ICD-10-CM

## 2023-05-10 DIAGNOSIS — N39.0 URINARY TRACT INFECTION ASSOCIATED WITH CATHETERIZATION OF URINARY TRACT, UNSPECIFIED INDWELLING URINARY CATHETER TYPE, INITIAL ENCOUNTER: ICD-10-CM

## 2023-05-10 DIAGNOSIS — N45.3 ORCHITIS AND EPIDIDYMITIS: Primary | ICD-10-CM

## 2023-05-10 DIAGNOSIS — N50.812 LEFT TESTICULAR PAIN: ICD-10-CM

## 2023-05-10 LAB
ALBUMIN SERPL-MCNC: 4.2 G/DL (ref 3.5–5)
ALBUMIN/GLOB SERPL: 1 RATIO (ref 1.1–2)
ALP SERPL-CCNC: 84 UNIT/L (ref 40–150)
ALT SERPL-CCNC: 16 UNIT/L (ref 0–55)
APPEARANCE UR: ABNORMAL
AST SERPL-CCNC: 23 UNIT/L (ref 5–34)
BACTERIA #/AREA URNS AUTO: ABNORMAL /HPF
BASOPHILS # BLD AUTO: 0.05 X10(3)/MCL
BASOPHILS NFR BLD AUTO: 0.6 %
BILIRUB UR QL STRIP.AUTO: ABNORMAL MG/DL
BILIRUBIN DIRECT+TOT PNL SERPL-MCNC: 0.7 MG/DL
BUN SERPL-MCNC: 12.9 MG/DL (ref 8.9–20.6)
C TRACH DNA SPEC QL NAA+PROBE: NOT DETECTED
CALCIUM SERPL-MCNC: 10.2 MG/DL (ref 8.4–10.2)
CHLORIDE SERPL-SCNC: 101 MMOL/L (ref 98–107)
CO2 SERPL-SCNC: 28 MMOL/L (ref 22–29)
COLOR UR: ABNORMAL
CREAT SERPL-MCNC: 1.13 MG/DL (ref 0.73–1.18)
EOSINOPHIL # BLD AUTO: 0.05 X10(3)/MCL (ref 0–0.9)
EOSINOPHIL NFR BLD AUTO: 0.6 %
ERYTHROCYTE [DISTWIDTH] IN BLOOD BY AUTOMATED COUNT: 13.6 % (ref 11.5–17)
GFR SERPLBLD CREATININE-BSD FMLA CKD-EPI: >60 MLS/MIN/1.73/M2
GLOBULIN SER-MCNC: 4.3 GM/DL (ref 2.4–3.5)
GLUCOSE SERPL-MCNC: 88 MG/DL (ref 74–100)
GLUCOSE UR QL STRIP.AUTO: NEGATIVE MG/DL
HCT VFR BLD AUTO: 51.3 % (ref 42–52)
HGB BLD-MCNC: 16.7 G/DL (ref 14–18)
IMM GRANULOCYTES # BLD AUTO: 0.02 X10(3)/MCL (ref 0–0.04)
IMM GRANULOCYTES NFR BLD AUTO: 0.2 %
KETONES UR QL STRIP.AUTO: 15 MG/DL
LACTATE SERPL-SCNC: 1 MMOL/L (ref 0.5–2.2)
LEUKOCYTE ESTERASE UR QL STRIP.AUTO: ABNORMAL UNIT/L
LYMPHOCYTES # BLD AUTO: 2.28 X10(3)/MCL (ref 0.6–4.6)
LYMPHOCYTES NFR BLD AUTO: 25.6 %
MCH RBC QN AUTO: 32.1 PG (ref 27–31)
MCHC RBC AUTO-ENTMCNC: 32.6 G/DL (ref 33–36)
MCV RBC AUTO: 98.5 FL (ref 80–94)
MONOCYTES # BLD AUTO: 0.77 X10(3)/MCL (ref 0.1–1.3)
MONOCYTES NFR BLD AUTO: 8.6 %
N GONORRHOEA DNA SPEC QL NAA+PROBE: NOT DETECTED
NEUTROPHILS # BLD AUTO: 5.74 X10(3)/MCL (ref 2.1–9.2)
NEUTROPHILS NFR BLD AUTO: 64.4 %
NITRITE UR QL STRIP.AUTO: POSITIVE
NRBC BLD AUTO-RTO: 0 %
PH UR STRIP.AUTO: 5.5 [PH]
PLATELET # BLD AUTO: 214 X10(3)/MCL (ref 130–400)
PMV BLD AUTO: 10.5 FL (ref 7.4–10.4)
POTASSIUM SERPL-SCNC: 4.2 MMOL/L (ref 3.5–5.1)
PROT SERPL-MCNC: 8.5 GM/DL (ref 6.4–8.3)
PROT UR QL STRIP.AUTO: 100 MG/DL
RBC # BLD AUTO: 5.21 X10(6)/MCL (ref 4.7–6.1)
RBC #/AREA URNS AUTO: ABNORMAL /HPF
RBC UR QL AUTO: ABNORMAL UNIT/L
SODIUM SERPL-SCNC: 140 MMOL/L (ref 136–145)
SP GR UR STRIP.AUTO: >=1.03
SQUAMOUS #/AREA URNS AUTO: ABNORMAL /HPF
UROBILINOGEN UR STRIP-ACNC: 2 MG/DL
WBC # SPEC AUTO: 8.91 X10(3)/MCL (ref 4.5–11.5)
WBC #/AREA URNS AUTO: >100 /HPF

## 2023-05-10 PROCEDURE — 83605 ASSAY OF LACTIC ACID: CPT | Performed by: STUDENT IN AN ORGANIZED HEALTH CARE EDUCATION/TRAINING PROGRAM

## 2023-05-10 PROCEDURE — 99284 EMERGENCY DEPT VISIT MOD MDM: CPT

## 2023-05-10 PROCEDURE — 25000003 PHARM REV CODE 250: Performed by: STUDENT IN AN ORGANIZED HEALTH CARE EDUCATION/TRAINING PROGRAM

## 2023-05-10 PROCEDURE — 85025 COMPLETE CBC W/AUTO DIFF WBC: CPT | Performed by: STUDENT IN AN ORGANIZED HEALTH CARE EDUCATION/TRAINING PROGRAM

## 2023-05-10 PROCEDURE — 87591 N.GONORRHOEAE DNA AMP PROB: CPT | Performed by: STUDENT IN AN ORGANIZED HEALTH CARE EDUCATION/TRAINING PROGRAM

## 2023-05-10 PROCEDURE — 87088 URINE BACTERIA CULTURE: CPT | Performed by: STUDENT IN AN ORGANIZED HEALTH CARE EDUCATION/TRAINING PROGRAM

## 2023-05-10 PROCEDURE — 80053 COMPREHEN METABOLIC PANEL: CPT | Performed by: STUDENT IN AN ORGANIZED HEALTH CARE EDUCATION/TRAINING PROGRAM

## 2023-05-10 PROCEDURE — 87077 CULTURE AEROBIC IDENTIFY: CPT | Performed by: STUDENT IN AN ORGANIZED HEALTH CARE EDUCATION/TRAINING PROGRAM

## 2023-05-10 PROCEDURE — 87040 BLOOD CULTURE FOR BACTERIA: CPT | Performed by: STUDENT IN AN ORGANIZED HEALTH CARE EDUCATION/TRAINING PROGRAM

## 2023-05-10 PROCEDURE — 81001 URINALYSIS AUTO W/SCOPE: CPT | Performed by: STUDENT IN AN ORGANIZED HEALTH CARE EDUCATION/TRAINING PROGRAM

## 2023-05-10 RX ORDER — LEVOFLOXACIN 500 MG/1
500 TABLET, FILM COATED ORAL DAILY
Qty: 10 TABLET | Refills: 0 | Status: SHIPPED | OUTPATIENT
Start: 2023-05-10 | End: 2023-05-20

## 2023-05-10 RX ORDER — HYDROCODONE BITARTRATE AND ACETAMINOPHEN 5; 325 MG/1; MG/1
1 TABLET ORAL
Status: COMPLETED | OUTPATIENT
Start: 2023-05-10 | End: 2023-05-10

## 2023-05-10 RX ADMIN — HYDROCODONE BITARTRATE AND ACETAMINOPHEN 1 TABLET: 5; 325 TABLET ORAL at 04:05

## 2023-05-10 NOTE — ED TRIAGE NOTES
Pt to er c/o pain and swelling to left testicle onset one week ago. Pt states currently does self catheterization.

## 2023-05-10 NOTE — ED PROVIDER NOTES
Encounter Date: 5/10/2023       History     Chief Complaint   Patient presents with    Testicle Pain     Pt to er c/o pain and swelling to left testicle onset one week ago. Pt states currently does self catheterization.     HPI    48-year-old male with a past medical history of cauda equina syndrome resulting in urinary retention, patient has to self cath, presents emergency department for pain swelling and redness to left testicle.  Patient states it started about a week ago.  States it is worsening.  States his testicle is 3 times its size.  Also states that he feels like he is having some fevers.    Review of patient's allergies indicates:   Allergen Reactions    Naproxen sodium      Other reaction(s): fast heart rate    Aspirin Palpitations     Per pt makes his heart race     Past Medical History:   Diagnosis Date    Addiction to drug     Anxiety     Depression     per pt denies hx of depression    Herniated thoracic disc without myelopathy     Pancreatitis      Past Surgical History:   Procedure Laterality Date    MAGNETIC RESONANCE IMAGING N/A 7/20/2022    Procedure: MRI (MAGNETIC RESONANCE IMAGING);  Surgeon: Tony Trevino DO;  Location: [facility];  Service: Anesthesiology;  Laterality: N/A;  MRI LUMBAR SPINE WITH AND WITHOUT CONTRAST WITH ANESTHESIA    MINIMALLY INVASIVE SURGICAL REMOVAL OF INTERVERTEBRAL DISC OF SPINE USING MICROSCOPE N/A 7/22/2022    Procedure: DISCECTOMY, SPINE, MINIMALLY INVASIVE, USING MICROSCOPE;  Surgeon: Gypsy Dubois MD;  Location: [facility] OR;  Service: Neurosurgery;  Laterality: N/A;  RIGHT   L4-5  MIS DISCECTOMY //  NDM    NECK SURGERY      gland removed at age 13 to r/o cancer     Family History   Problem Relation Age of Onset    Alcohol abuse Paternal Grandfather      Social History     Tobacco Use    Smoking status: Every Day     Packs/day: 1.00     Years: 1.00     Pack years: 1.00     Types: Cigarettes     Start date: 10/1/2016    Smokeless tobacco: Never   Substance Use Topics     Alcohol use: Yes     Comment: occassionaly in a month    Drug use: No     Comment: only tried in the past     Review of Systems   Constitutional:  Positive for chills and fever.   Respiratory:  Negative for cough.    Cardiovascular:  Negative for chest pain.   Gastrointestinal:  Negative for abdominal pain.   Genitourinary:  Positive for scrotal swelling and testicular pain. Negative for penile pain and penile swelling.   All other systems reviewed and are negative.    Physical Exam     Initial Vitals [05/10/23 1419]   BP Pulse Resp Temp SpO2   (!) 129/92 80 18 97.2 °F (36.2 °C) 98 %      MAP       --         Physical Exam    Nursing note and vitals reviewed.  Constitutional: He appears well-developed and well-nourished. No distress.   Cardiovascular:  Normal rate and regular rhythm.           Pulmonary/Chest: Breath sounds normal.   Abdominal: Abdomen is soft. There is no abdominal tenderness.   Genitourinary: Right testis shows no mass, no swelling and no tenderness. Left testis shows mass, swelling and tenderness.   Musculoskeletal:         General: No tenderness. Normal range of motion.     Neurological: He is alert and oriented to person, place, and time.   Skin: Skin is warm. Capillary refill takes less than 2 seconds.       ED Course   Procedures  Labs Reviewed   URINALYSIS, REFLEX TO URINE CULTURE - Abnormal; Notable for the following components:       Result Value    Color, UA Brown (*)     Appearance, UA Cloudy (*)     Protein,  (*)     Ketones, UA 15 (*)     Blood, UA Large (*)     Bilirubin, UA Small (*)     Urobilinogen, UA 2.0 (*)     Nitrites, UA Positive (*)     Leukocyte Esterase, UA Moderate (*)     All other components within normal limits   URINALYSIS, MICROSCOPIC - Abnormal; Notable for the following components:    Bacteria, UA Many (*)     WBC, UA >100 (*)     All other components within normal limits   COMPREHENSIVE METABOLIC PANEL - Abnormal; Notable for the following components:     Protein Total 8.5 (*)     Globulin 4.3 (*)     Albumin/Globulin Ratio 1.0 (*)     All other components within normal limits   CBC WITH DIFFERENTIAL - Abnormal; Notable for the following components:    MCV 98.5 (*)     MCH 32.1 (*)     MCHC 32.6 (*)     MPV 10.5 (*)     All other components within normal limits   LACTIC ACID, PLASMA - Normal   BLOOD CULTURE [facility]   BLOOD CULTURE [facility]   CULTURE, URINE   CHLAMYDIA/GONORRHOEAE(GC), PCR   CBC W/ AUTO DIFFERENTIAL    Narrative:     The following orders were created for panel order CBC auto differential.  Procedure                               Abnormality         Status                     ---------                               -----------         ------                     CBC with Differential[[id number]]        Abnormal            Final result                 Please view results for these tests on the individual orders.          Imaging Results              US Scrotum And Testicles (Final result)  Result time 05/10/23 16:51:10      Final result by David Gresham MD (05/10/23 16:51:10)                   Impression:      1. Negative for testicular torsion and solid mass.  2. Left epididymo-orchitis with small hydrocele.      Electronically signed by: David Gresham  Date:    05/10/2023  Time:    16:51               Narrative:    EXAMINATION:  US SCROTUM AND TESTICLES    CLINICAL HISTORY:  Left testicular pain    COMPARISON:  No priors    FINDINGS:  Real-time exam with grayscale, color, and spectral imaging of the scrotum was performed.    The right testicle measures 2.9 x 2.1 x 1.4 cm and the left measures 2.7 x 2.0 x 1.9 cm. Color-flow and arterial waveforms are present within both testicles. There are no findings of torsion. The testicles are homogeneous in echotexture without intratesticular mass.    There is a small left hydrocele with some septations.  Enlarged heterogeneous hyperemic left epididymis.  Left testicle also slightly hyperemic.                                        Medications   HYDROcodone-acetaminophen 5-325 mg per tablet 1 tablet (1 tablet Oral Given 5/10/23 1601)     Medical Decision Making:   Differential Diagnosis:   Orchitis, epididymitis, testicular cancer, testicular torsion, UTI   Medical Decision Making  Problems Addressed:  Orchitis and epididymitis: acute illness or injury  Urinary tract infection associated with catheterization of urinary tract, unspecified indwelling urinary catheter type, initial encounter: acute illness or injury    Amount and/or Complexity of Data Reviewed  External Data Reviewed: labs and notes.  Labs: ordered. Decision-making details documented in ED Course.  Radiology: ordered.    Risk  OTC drugs.  Prescription drug management.              ED Course as of 05/10/23 1707   Wed May 10, 2023   1549 Bacteria, UA(!): Many [BS]   1549 WBC, UA(!): >100 [BS]   1555 NITRITE UA(!): Positive [BS]   1555 Leukocytes, UA(!): Moderate [BS]   1632 WBC: 8.91 [BS]   1632 Hemoglobin: 16.7 [BS]   1632 Hematocrit: 51.3 [BS]   1632 Platelets: 214 [BS]   1647 Lactate, Sanchez: 1.0 [BS]   1649 Sodium: 140 [BS]   1649 Potassium: 4.2 [BS]   1649 Chloride: 101 [BS]   1649 CO2: 28 [BS]   1649 Glucose: 88 [BS]   1649 BUN: 12.9 [BS]   1649 Creatinine: 1.13 [BS]   1649 Calcium: 10.2 [BS]      ED Course User Index  [BS] Fran Yi MD                 Clinical Impression:   Final diagnoses:  [N50.812] Left testicular pain  [N45.3] Orchitis and epididymitis (Primary)  [T83.511A, N39.0] Urinary tract infection associated with catheterization of urinary tract, unspecified indwelling urinary catheter type, initial encounter        ED Disposition Condition    Discharge Stable          ED Prescriptions       Medication Sig Dispense Start Date End Date Auth. Provider    levoFLOXacin (LEVAQUIN) 500 MG tablet Take 1 tablet (500 mg total) by mouth once daily. for 10 days 10 tablet 5/10/2023 5/20/2023 Fran Yi MD          Follow-up Information       Follow  up With Specialties Details Why Contact Info    Lio Patel MD Family Medicine Schedule an appointment as soon as possible for a visit   [address] [facility]  [address]  [place] [address]  [phone]      [facility] - Emergency Dept Emergency Medicine Go to  If symptoms worsen [id number]  [facility] [id number] [phone]             Fran Yi MD  05/10/23 2279

## 2023-05-13 LAB — BACTERIA UR CULT: ABNORMAL

## 2023-05-15 LAB
BACTERIA BLD CULT: NORMAL
BACTERIA BLD CULT: NORMAL

## 2023-06-11 ENCOUNTER — HOSPITAL ENCOUNTER (EMERGENCY)
Facility: HOSPITAL | Age: 48
Discharge: PSYCHIATRIC OR SUBSTANCE USE FACILITY | End: 2023-06-12
Attending: STUDENT IN AN ORGANIZED HEALTH CARE EDUCATION/TRAINING PROGRAM
Payer: MEDICAID

## 2023-06-11 DIAGNOSIS — R44.0 AUDITORY HALLUCINATION: ICD-10-CM

## 2023-06-11 DIAGNOSIS — F32.A DEPRESSION, UNSPECIFIED DEPRESSION TYPE: ICD-10-CM

## 2023-06-11 DIAGNOSIS — Z00.8 MEDICAL CLEARANCE FOR PSYCHIATRIC ADMISSION: Primary | ICD-10-CM

## 2023-06-11 DIAGNOSIS — E86.0 MILD DEHYDRATION: ICD-10-CM

## 2023-06-11 DIAGNOSIS — R45.851 SUICIDAL IDEATION: ICD-10-CM

## 2023-06-11 LAB
ALBUMIN SERPL-MCNC: 4.3 G/DL (ref 3.5–5)
ALBUMIN/GLOB SERPL: 1.3 RATIO (ref 1.1–2)
ALP SERPL-CCNC: 81 UNIT/L (ref 40–150)
ALT SERPL-CCNC: 17 UNIT/L (ref 0–55)
AMPHET UR QL SCN: POSITIVE
APAP SERPL-MCNC: <17.4 UG/ML (ref 17.4–30)
APPEARANCE UR: CLEAR
AST SERPL-CCNC: 24 UNIT/L (ref 5–34)
BACTERIA #/AREA URNS AUTO: ABNORMAL /HPF
BARBITURATE SCN PRESENT UR: NEGATIVE
BASOPHILS # BLD AUTO: 0.03 X10(3)/MCL
BASOPHILS NFR BLD AUTO: 0.3 %
BENZODIAZ UR QL SCN: NEGATIVE
BILIRUB UR QL STRIP.AUTO: ABNORMAL MG/DL
BILIRUBIN DIRECT+TOT PNL SERPL-MCNC: 1.3 MG/DL
BUN SERPL-MCNC: 30.2 MG/DL (ref 8.9–20.6)
CALCIUM SERPL-MCNC: 10 MG/DL (ref 8.4–10.2)
CANNABINOIDS UR QL SCN: POSITIVE
CHLORIDE SERPL-SCNC: 99 MMOL/L (ref 98–107)
CO2 SERPL-SCNC: 20 MMOL/L (ref 22–29)
COCAINE UR QL SCN: NEGATIVE
COLOR UR: ABNORMAL
CREAT SERPL-MCNC: 1.5 MG/DL (ref 0.73–1.18)
EOSINOPHIL # BLD AUTO: 0.14 X10(3)/MCL (ref 0–0.9)
EOSINOPHIL NFR BLD AUTO: 1.2 %
ERYTHROCYTE [DISTWIDTH] IN BLOOD BY AUTOMATED COUNT: 13.3 % (ref 11.5–17)
ETHANOL SERPL-MCNC: <10 MG/DL
FENTANYL UR QL SCN: NEGATIVE
FINE GRAN CASTS URNS QL MICRO: ABNORMAL /LPF
GFR SERPLBLD CREATININE-BSD FMLA CKD-EPI: 57 MLS/MIN/1.73/M2
GLOBULIN SER-MCNC: 3.4 GM/DL (ref 2.4–3.5)
GLUCOSE SERPL-MCNC: 109 MG/DL (ref 74–100)
GLUCOSE UR QL STRIP.AUTO: NEGATIVE MG/DL
HCT VFR BLD AUTO: 47.6 % (ref 42–52)
HGB BLD-MCNC: 16.4 G/DL (ref 14–18)
HYALINE CASTS URNS QL MICRO: ABNORMAL /LPF
IMM GRANULOCYTES # BLD AUTO: 0.04 X10(3)/MCL (ref 0–0.04)
IMM GRANULOCYTES NFR BLD AUTO: 0.3 %
KETONES UR QL STRIP.AUTO: NEGATIVE MG/DL
LEUKOCYTE ESTERASE UR QL STRIP.AUTO: NEGATIVE UNIT/L
LYMPHOCYTES # BLD AUTO: 1.59 X10(3)/MCL (ref 0.6–4.6)
LYMPHOCYTES NFR BLD AUTO: 13.3 %
MCH RBC QN AUTO: 31.8 PG (ref 27–31)
MCHC RBC AUTO-ENTMCNC: 34.5 G/DL (ref 33–36)
MCV RBC AUTO: 92.4 FL (ref 80–94)
MDMA UR QL SCN: NEGATIVE
MONOCYTES # BLD AUTO: 0.66 X10(3)/MCL (ref 0.1–1.3)
MONOCYTES NFR BLD AUTO: 5.5 %
MUCOUS THREADS URNS QL MICRO: ABNORMAL /LPF
NEUTROPHILS # BLD AUTO: 9.5 X10(3)/MCL (ref 2.1–9.2)
NEUTROPHILS NFR BLD AUTO: 79.4 %
NITRITE UR QL STRIP.AUTO: NEGATIVE
NRBC BLD AUTO-RTO: 0 %
OPIATES UR QL SCN: NEGATIVE
PCP UR QL: NEGATIVE
PH UR STRIP.AUTO: 5.5 [PH]
PH UR: 5.5 [PH] (ref 3–11)
PLATELET # BLD AUTO: 191 X10(3)/MCL (ref 130–400)
PMV BLD AUTO: 11.3 FL (ref 7.4–10.4)
POTASSIUM SERPL-SCNC: 4.3 MMOL/L (ref 3.5–5.1)
PROT SERPL-MCNC: 7.7 GM/DL (ref 6.4–8.3)
PROT UR QL STRIP.AUTO: ABNORMAL MG/DL
RBC # BLD AUTO: 5.15 X10(6)/MCL (ref 4.7–6.1)
RBC #/AREA URNS AUTO: ABNORMAL /HPF
RBC UR QL AUTO: NEGATIVE UNIT/L
SARS-COV-2 RDRP RESP QL NAA+PROBE: NEGATIVE
SODIUM SERPL-SCNC: 131 MMOL/L (ref 136–145)
SP GR UR STRIP.AUTO: >=1.03
SQUAMOUS #/AREA URNS AUTO: ABNORMAL /HPF
TSH SERPL-ACNC: 1.73 UIU/ML (ref 0.35–4.94)
UROBILINOGEN UR STRIP-ACNC: 1 MG/DL
WBC # SPEC AUTO: 11.96 X10(3)/MCL (ref 4.5–11.5)
WBC #/AREA URNS AUTO: ABNORMAL /HPF

## 2023-06-11 PROCEDURE — 25000003 PHARM REV CODE 250: Performed by: STUDENT IN AN ORGANIZED HEALTH CARE EDUCATION/TRAINING PROGRAM

## 2023-06-11 PROCEDURE — 96361 HYDRATE IV INFUSION ADD-ON: CPT

## 2023-06-11 PROCEDURE — 81001 URINALYSIS AUTO W/SCOPE: CPT | Mod: 59 | Performed by: STUDENT IN AN ORGANIZED HEALTH CARE EDUCATION/TRAINING PROGRAM

## 2023-06-11 PROCEDURE — 84443 ASSAY THYROID STIM HORMONE: CPT | Performed by: STUDENT IN AN ORGANIZED HEALTH CARE EDUCATION/TRAINING PROGRAM

## 2023-06-11 PROCEDURE — 85025 COMPLETE CBC W/AUTO DIFF WBC: CPT | Performed by: STUDENT IN AN ORGANIZED HEALTH CARE EDUCATION/TRAINING PROGRAM

## 2023-06-11 PROCEDURE — 80143 DRUG ASSAY ACETAMINOPHEN: CPT | Performed by: STUDENT IN AN ORGANIZED HEALTH CARE EDUCATION/TRAINING PROGRAM

## 2023-06-11 PROCEDURE — 87635 SARS-COV-2 COVID-19 AMP PRB: CPT | Performed by: STUDENT IN AN ORGANIZED HEALTH CARE EDUCATION/TRAINING PROGRAM

## 2023-06-11 PROCEDURE — 82077 ASSAY SPEC XCP UR&BREATH IA: CPT | Performed by: STUDENT IN AN ORGANIZED HEALTH CARE EDUCATION/TRAINING PROGRAM

## 2023-06-11 PROCEDURE — 96360 HYDRATION IV INFUSION INIT: CPT

## 2023-06-11 PROCEDURE — 80307 DRUG TEST PRSMV CHEM ANLYZR: CPT | Performed by: STUDENT IN AN ORGANIZED HEALTH CARE EDUCATION/TRAINING PROGRAM

## 2023-06-11 PROCEDURE — 99285 EMERGENCY DEPT VISIT HI MDM: CPT | Mod: 25

## 2023-06-11 PROCEDURE — 80053 COMPREHEN METABOLIC PANEL: CPT | Performed by: STUDENT IN AN ORGANIZED HEALTH CARE EDUCATION/TRAINING PROGRAM

## 2023-06-11 RX ORDER — PROPRANOLOL HYDROCHLORIDE 20 MG/1
20 TABLET ORAL 3 TIMES DAILY
COMMUNITY
Start: 2023-06-01

## 2023-06-11 RX ORDER — TRAZODONE HYDROCHLORIDE 100 MG/1
100 TABLET ORAL
COMMUNITY
Start: 2021-12-27

## 2023-06-11 RX ORDER — QUETIAPINE 400 MG/1
400 TABLET, FILM COATED ORAL NIGHTLY
COMMUNITY
Start: 2023-06-01

## 2023-06-11 RX ORDER — LORAZEPAM 1 MG/1
2 TABLET ORAL
Status: COMPLETED | OUTPATIENT
Start: 2023-06-11 | End: 2023-06-11

## 2023-06-11 RX ORDER — FLUOXETINE HYDROCHLORIDE 20 MG/1
20 CAPSULE ORAL
COMMUNITY
Start: 2023-04-26

## 2023-06-11 RX ORDER — BUPROPION HYDROCHLORIDE 150 MG/1
150 TABLET, EXTENDED RELEASE ORAL
COMMUNITY
Start: 2023-06-01

## 2023-06-11 RX ORDER — PRAVASTATIN SODIUM 40 MG/1
40 TABLET ORAL NIGHTLY
COMMUNITY
Start: 2023-02-15

## 2023-06-11 RX ORDER — TESTOSTERONE CYPIONATE 200 MG/ML
INJECTION, SOLUTION INTRAMUSCULAR
COMMUNITY
Start: 2023-04-28

## 2023-06-11 RX ORDER — BUPRENORPHINE HYDROCHLORIDE 8 MG/1
8 TABLET SUBLINGUAL 3 TIMES DAILY
COMMUNITY
Start: 2023-06-01

## 2023-06-11 RX ADMIN — LORAZEPAM 2 MG: 1 TABLET ORAL at 08:06

## 2023-06-11 RX ADMIN — SODIUM CHLORIDE 1000 ML: 9 INJECTION, SOLUTION INTRAVENOUS at 09:06

## 2023-06-12 VITALS
HEART RATE: 72 BPM | HEIGHT: 71 IN | DIASTOLIC BLOOD PRESSURE: 86 MMHG | RESPIRATION RATE: 18 BRPM | BODY MASS INDEX: 24.5 KG/M2 | WEIGHT: 175 LBS | OXYGEN SATURATION: 95 % | SYSTOLIC BLOOD PRESSURE: 114 MMHG | TEMPERATURE: 98 F

## 2023-06-12 NOTE — ED PROVIDER NOTES
Encounter Date: 6/11/2023       History     Chief Complaint   Patient presents with    Psychiatric Evaluation     Patient c/o suicidal and needs a break. Request to come to the hospital. Hx. Of psyc.      HPI    48-year-old male with a past medical history of schizophrenia and neurogenic bladder secondary to cauda equina presents emergency department for auditory hallucinations, depression or suicidal ideation.  Patient states he has not been taking his psychiatric medications.  States hallucinations are worsening, and he is depressed and wants to kill himself.    Review of patient's allergies indicates:   Allergen Reactions    Naproxen sodium      Other reaction(s): fast heart rate    Aspirin Palpitations     Per pt makes his heart race     Past Medical History:   Diagnosis Date    Addiction to drug     Anxiety     Depression     per pt denies hx of depression    Herniated thoracic disc without myelopathy     Pancreatitis     Self-catheterizes urinary bladder      Past Surgical History:   Procedure Laterality Date    MAGNETIC RESONANCE IMAGING N/A 7/20/2022    Procedure: MRI (MAGNETIC RESONANCE IMAGING);  Surgeon: Tony Trevino DO;  Location: [facility] OR;  Service: Anesthesiology;  Laterality: N/A;  MRI LUMBAR SPINE WITH AND WITHOUT CONTRAST WITH ANESTHESIA    MINIMALLY INVASIVE SURGICAL REMOVAL OF INTERVERTEBRAL DISC OF SPINE USING MICROSCOPE N/A 7/22/2022    Procedure: DISCECTOMY, SPINE, MINIMALLY INVASIVE, USING MICROSCOPE;  Surgeon: Gypsy Dubois MD;  Location: [facility] OR;  Service: Neurosurgery;  Laterality: N/A;  RIGHT   L4-5  MIS DISCECTOMY //  NDM    NECK SURGERY      gland removed at age 13 to r/o cancer     Family History   Problem Relation Age of Onset    Alcohol abuse Paternal Grandfather      Social History     Tobacco Use    Smoking status: Every Day     Packs/day: 1.00     Years: 1.00     Pack years: 1.00     Types: Cigarettes     Start date: 10/1/2016    Smokeless tobacco: Never   Substance Use Topics     Alcohol use: Yes     Comment: occassionaly in a month    Drug use: No     Comment: only tried in the past     Review of Systems   Constitutional:  Negative for fever.   Respiratory:  Negative for cough and shortness of breath.    Cardiovascular:  Negative for chest pain.   Gastrointestinal:  Negative for abdominal pain.   Musculoskeletal:  Positive for back pain (chronic).   Psychiatric/Behavioral:  Positive for dysphoric mood, hallucinations and suicidal ideas.    All other systems reviewed and are negative.    Physical Exam     Initial Vitals   BP Pulse Resp Temp SpO2   06/11/23 2026 06/11/23 2022 06/11/23 2022 06/11/23 2026 06/11/23 2022   (!) 128/92 98 19 98.2 °F (36.8 °C) 95 %      MAP       --                Physical Exam    Nursing note and vitals reviewed.  Constitutional: He appears well-developed and well-nourished. No distress.   Cardiovascular:  Normal rate and regular rhythm.           Pulmonary/Chest: Breath sounds normal. No respiratory distress.   Abdominal: Abdomen is soft.   Musculoskeletal:         General: No tenderness. Normal range of motion.     Neurological: He is alert and oriented to person, place, and time.   Skin: Skin is warm. Capillary refill takes less than 2 seconds.   Psychiatric: His speech is normal and behavior is normal. Thought content is delusional. He exhibits a depressed mood. He expresses suicidal ideation. He expresses no homicidal ideation. He expresses no suicidal plans and no homicidal plans.       ED Course   Procedures  Labs Reviewed   COMPREHENSIVE METABOLIC PANEL - Abnormal; Notable for the following components:       Result Value    Sodium Level 131 (*)     Carbon Dioxide 20 (*)     Glucose Level 109 (*)     Blood Urea Nitrogen 30.2 (*)     Creatinine 1.50 (*)     All other components within normal limits   URINALYSIS, REFLEX TO URINE CULTURE - Abnormal; Notable for the following components:    Protein, UA Trace (*)     Bilirubin, UA Small (*)     All other  components within normal limits   DRUG SCREEN, URINE (BEAKER) - Abnormal; Notable for the following components:    Amphetamines, Urine Positive (*)     Cannabinoids, Urine Positive (*)     All other components within normal limits    Narrative:     Cut off concentrations:    Amphetamines - 1000 ng/ml  Barbiturates - 200 ng/ml  Benzodiazepine - 200 ng/ml  Cannabinoids (THC) - 50 ng/ml  Cocaine - 300 ng/ml  Fentanyl - 1.0 ng/ml  MDMA - 500 ng/ml  Opiates - 300 ng/ml   Phencyclidine (PCP) - 25 ng/ml    Specimen submitted for drug analysis and tested for pH and specific gravity in order to evaluate sample integrity. Suspect tampering if specific gravity is <1.003 and/or pH is not within the range of 4.5 - 8.0  False negatives may result form substances such as bleach added to urine.  False positives may result for the presence of a substance with similar chemical structure to the drug or its metabolite.    This test provides only a PRELIMINARY analytical test result. A more specific alternate chemical method must be used in order to obtain a confirmed analytical result. Gas chromatography/mass spectrometry (GC/MS) is the preferred confirmatory method. Other chemical confirmation methods are available. Clinical consideration and professional judgement should be applied to any drug of abuse test result, particularly when preliminary positive results are used.    Positive results will be confirmed only at the physicians request. Unconfirmed screening results are to be used only for medical purposes (treatment).        ACETAMINOPHEN LEVEL - Abnormal; Notable for the following components:    Acetaminophen Level <17.4 (*)     All other components within normal limits   CBC WITH DIFFERENTIAL - Abnormal; Notable for the following components:    WBC 11.96 (*)     MCH 31.8 (*)     MPV 11.3 (*)     Neut # 9.50 (*)     All other components within normal limits   URINALYSIS, MICROSCOPIC - Abnormal; Notable for the following  components:    Hyaline Casts, UA Few (*)     Mucous, UA Small (*)     Fine Granular Casts, UA Few (*)     RBC, UA 6-10 (*)     All other components within normal limits   TSH - Normal   ALCOHOL,MEDICAL (ETHANOL) - Normal   SARS-COV-2 RNA AMPLIFICATION, QUAL - Normal    Narrative:     The IDNOW COVID-19 assay is a rapid molecular in vitro diagnostic test utilizing an isothermal nucleic acid amplification technology intended for the qualitative detection of nucleic acid from the SARS-CoV-2 viral RNA in direct nasal, nasopharyngeal or throat swabs from individuals who are suspected of COVID-19 by their healthcare provider.   CBC W/ AUTO DIFFERENTIAL    Narrative:     The following orders were created for panel order CBC auto differential.  Procedure                               Abnormality         Status                     ---------                               -----------         ------                     CBC with Differential[[id number]]        Abnormal            Final result                 Please view results for these tests on the individual orders.          Imaging Results    None          Medications   sodium chloride 0.9% bolus 1,000 mL 1,000 mL (1,000 mLs Intravenous New Bag 6/11/23 2150)   LORazepam tablet 2 mg (2 mg Oral Given 6/11/23 2059)     Medical Decision Making:   Differential Diagnosis:   Psychiatric disorder, medication noncompliance suicidal ideation   Medical Decision Making  Problems Addressed:  Auditory hallucination: acute illness or injury  Depression, unspecified depression type: chronic illness or injury  Mild dehydration: self-limited or minor problem  Suicidal ideation: acute illness or injury    Amount and/or Complexity of Data Reviewed  External Data Reviewed: notes.  Labs: ordered. Decision-making details documented in ED Course.    Risk  OTC drugs.  Prescription drug management.  Decision regarding hospitalization.              ED Course as of 06/11/23 2206   Sun Jun 11, 2023    2204 Sodium(!): 131 [BS]   2204 Glucose(!): 109 [BS]   2204 BUN(!): 30.2 [BS]   2204 Creatinine(!): 1.50 [BS]   2204 Will give a L of will give a L of fluids [BS]   2204 Amphetamine Screen, Ur(!): Positive [BS]   2205 Cannabinoids, Urine(!): Positive [BS]   2205 WBC(!): 11.96 [BS]   2205 Hemoglobin: 16.4 [BS]   2205 Hematocrit: 47.6 [BS]   2205 Platelets: 191 [BS]   2205 Thyroid Stimulating Hormone: 1.727 [BS]   2205 NITRITE UA: Negative [BS]   2205 Leukocytes, UA: Negative [BS]      ED Course User Index  [BS] Fran Yi MD       Medically cleared for psychiatry placement: 6/11/2023 10:05 PM         Clinical Impression:   Final diagnoses:  [Z00.8] Medical clearance for psychiatric admission (Primary)  [R45.851] Suicidal ideation  [F32.A] Depression, unspecified depression type  [R44.0] Auditory hallucination  [E86.0] Mild dehydration        ED Disposition Condition    Transfer to Psych Facility Stable          ED Prescriptions    None       Follow-up Information    None          Fran Yi MD  06/11/23 2206

## 2023-12-29 ENCOUNTER — HOSPITAL ENCOUNTER (EMERGENCY)
Facility: HOSPITAL | Age: 48
Discharge: PSYCHIATRIC OR SUBSTANCE USE FACILITY | End: 2023-12-29
Attending: EMERGENCY MEDICINE
Payer: MEDICAID

## 2023-12-29 VITALS
TEMPERATURE: 98 F | OXYGEN SATURATION: 100 % | DIASTOLIC BLOOD PRESSURE: 93 MMHG | HEART RATE: 65 BPM | WEIGHT: 170 LBS | RESPIRATION RATE: 18 BRPM | BODY MASS INDEX: 23.8 KG/M2 | HEIGHT: 71 IN | SYSTOLIC BLOOD PRESSURE: 131 MMHG

## 2023-12-29 DIAGNOSIS — R45.851 SUICIDAL IDEATION: Primary | ICD-10-CM

## 2023-12-29 DIAGNOSIS — R45.851 SUICIDAL THOUGHTS: ICD-10-CM

## 2023-12-29 LAB
ALBUMIN SERPL-MCNC: 4 G/DL (ref 3.5–5)
ALBUMIN/GLOB SERPL: 1.4 RATIO (ref 1.1–2)
ALP SERPL-CCNC: 69 UNIT/L (ref 40–150)
ALT SERPL-CCNC: 16 UNIT/L (ref 0–55)
AMPHET UR QL SCN: NEGATIVE
APAP SERPL-MCNC: <17.4 UG/ML (ref 17.4–30)
APPEARANCE UR: CLEAR
AST SERPL-CCNC: 19 UNIT/L (ref 5–34)
BARBITURATE SCN PRESENT UR: NEGATIVE
BASOPHILS # BLD AUTO: 0.04 X10(3)/MCL
BASOPHILS NFR BLD AUTO: 0.6 %
BENZODIAZ UR QL SCN: NEGATIVE
BILIRUB SERPL-MCNC: 0.4 MG/DL
BILIRUB UR QL STRIP.AUTO: NEGATIVE
BUN SERPL-MCNC: 18.8 MG/DL (ref 8.9–20.6)
CALCIUM SERPL-MCNC: 9.2 MG/DL (ref 8.4–10.2)
CANNABINOIDS UR QL SCN: NEGATIVE
CHLORIDE SERPL-SCNC: 102 MMOL/L (ref 98–107)
CO2 SERPL-SCNC: 28 MMOL/L (ref 22–29)
COCAINE UR QL SCN: NEGATIVE
COLOR UR AUTO: YELLOW
CREAT SERPL-MCNC: 0.92 MG/DL (ref 0.73–1.18)
EOSINOPHIL # BLD AUTO: 0.17 X10(3)/MCL (ref 0–0.9)
EOSINOPHIL NFR BLD AUTO: 2.4 %
ERYTHROCYTE [DISTWIDTH] IN BLOOD BY AUTOMATED COUNT: 13.1 % (ref 11.5–17)
ETHANOL SERPL-MCNC: <10 MG/DL
FENTANYL UR QL SCN: NEGATIVE
FLUAV AG UPPER RESP QL IA.RAPID: NOT DETECTED
FLUBV AG UPPER RESP QL IA.RAPID: NOT DETECTED
GFR SERPLBLD CREATININE-BSD FMLA CKD-EPI: >60 MLS/MIN/1.73/M2
GLOBULIN SER-MCNC: 2.9 GM/DL (ref 2.4–3.5)
GLUCOSE SERPL-MCNC: 90 MG/DL (ref 74–100)
GLUCOSE UR QL STRIP.AUTO: NEGATIVE
HCT VFR BLD AUTO: 40 % (ref 42–52)
HGB BLD-MCNC: 13.3 G/DL (ref 14–18)
IMM GRANULOCYTES # BLD AUTO: 0.01 X10(3)/MCL (ref 0–0.04)
IMM GRANULOCYTES NFR BLD AUTO: 0.1 %
KETONES UR QL STRIP.AUTO: NEGATIVE
LEUKOCYTE ESTERASE UR QL STRIP.AUTO: NEGATIVE
LYMPHOCYTES # BLD AUTO: 2.34 X10(3)/MCL (ref 0.6–4.6)
LYMPHOCYTES NFR BLD AUTO: 33.7 %
MCH RBC QN AUTO: 31.3 PG (ref 27–31)
MCHC RBC AUTO-ENTMCNC: 33.3 G/DL (ref 33–36)
MCV RBC AUTO: 94.1 FL (ref 80–94)
MDMA UR QL SCN: NEGATIVE
MONOCYTES # BLD AUTO: 0.45 X10(3)/MCL (ref 0.1–1.3)
MONOCYTES NFR BLD AUTO: 6.5 %
NEUTROPHILS # BLD AUTO: 3.94 X10(3)/MCL (ref 2.1–9.2)
NEUTROPHILS NFR BLD AUTO: 56.7 %
NITRITE UR QL STRIP.AUTO: NEGATIVE
NRBC BLD AUTO-RTO: 0 %
OPIATES UR QL SCN: NEGATIVE
PCP UR QL: NEGATIVE
PH UR STRIP.AUTO: 5.5 [PH]
PH UR: 5.5 [PH] (ref 3–11)
PLATELET # BLD AUTO: 161 X10(3)/MCL (ref 130–400)
PMV BLD AUTO: 10.4 FL (ref 7.4–10.4)
POTASSIUM SERPL-SCNC: 4.4 MMOL/L (ref 3.5–5.1)
PROT SERPL-MCNC: 6.9 GM/DL (ref 6.4–8.3)
PROT UR QL STRIP.AUTO: NEGATIVE
RBC # BLD AUTO: 4.25 X10(6)/MCL (ref 4.7–6.1)
RBC UR QL AUTO: NEGATIVE
RSV A 5' UTR RNA NPH QL NAA+PROBE: NOT DETECTED
SARS-COV-2 RNA RESP QL NAA+PROBE: NOT DETECTED
SODIUM SERPL-SCNC: 138 MMOL/L (ref 136–145)
SP GR UR STRIP.AUTO: >=1.03 (ref 1–1.03)
SPECIFIC GRAVITY, URINE AUTO (.000) (OHS): >=1.03 (ref 1–1.03)
TSH SERPL-ACNC: 3.22 UIU/ML (ref 0.35–4.94)
UROBILINOGEN UR STRIP-ACNC: 0.2
WBC # SPEC AUTO: 6.95 X10(3)/MCL (ref 4.5–11.5)

## 2023-12-29 PROCEDURE — 80307 DRUG TEST PRSMV CHEM ANLYZR: CPT | Performed by: EMERGENCY MEDICINE

## 2023-12-29 PROCEDURE — 81003 URINALYSIS AUTO W/O SCOPE: CPT | Performed by: EMERGENCY MEDICINE

## 2023-12-29 PROCEDURE — 80053 COMPREHEN METABOLIC PANEL: CPT | Performed by: EMERGENCY MEDICINE

## 2023-12-29 PROCEDURE — 82077 ASSAY SPEC XCP UR&BREATH IA: CPT | Performed by: EMERGENCY MEDICINE

## 2023-12-29 PROCEDURE — 0241U COVID/RSV/FLU A&B PCR: CPT | Performed by: EMERGENCY MEDICINE

## 2023-12-29 PROCEDURE — 93010 ELECTROCARDIOGRAM REPORT: CPT | Mod: ,,, | Performed by: INTERNAL MEDICINE

## 2023-12-29 PROCEDURE — 84443 ASSAY THYROID STIM HORMONE: CPT | Performed by: EMERGENCY MEDICINE

## 2023-12-29 PROCEDURE — 80143 DRUG ASSAY ACETAMINOPHEN: CPT | Performed by: EMERGENCY MEDICINE

## 2023-12-29 PROCEDURE — 93005 ELECTROCARDIOGRAM TRACING: CPT

## 2023-12-29 PROCEDURE — 99285 EMERGENCY DEPT VISIT HI MDM: CPT

## 2023-12-29 PROCEDURE — 85025 COMPLETE CBC W/AUTO DIFF WBC: CPT | Performed by: EMERGENCY MEDICINE

## 2023-12-29 NOTE — ED PROVIDER NOTES
"Encounter Date: 12/29/2023       History     Chief Complaint   Patient presents with    Suicidal     c/o SI X 2 days. Wants to get checked out before " it gets worse"     48-year-old male complains of suicidal ideation.  No specific plan.  No hallucinations but he feels like his life is out of control and and believes that if he stays at his house, he might hurt himself.  He has had previous psychiatric admissions and admits to a history of drug addiction.  He has no physical complaints.        Review of patient's allergies indicates:   Allergen Reactions    Naproxen sodium      Other reaction(s): fast heart rate    Aspirin Palpitations     Per pt makes his heart race     Past Medical History:   Diagnosis Date    Addiction to drug     Anxiety     Depression     per pt denies hx of depression    Herniated thoracic disc without myelopathy     Pancreatitis     Self-catheterizes urinary bladder      Past Surgical History:   Procedure Laterality Date    MAGNETIC RESONANCE IMAGING N/A 7/20/2022    Procedure: MRI (MAGNETIC RESONANCE IMAGING);  Surgeon: Tony Trevino DO;  Location: [facility] OR;  Service: Anesthesiology;  Laterality: N/A;  MRI LUMBAR SPINE WITH AND WITHOUT CONTRAST WITH ANESTHESIA    MINIMALLY INVASIVE SURGICAL REMOVAL OF INTERVERTEBRAL DISC OF SPINE USING MICROSCOPE N/A 7/22/2022    Procedure: DISCECTOMY, SPINE, MINIMALLY INVASIVE, USING MICROSCOPE;  Surgeon: Gypsy Dubois MD;  Location: [facility] OR;  Service: Neurosurgery;  Laterality: N/A;  RIGHT   L4-5  MIS DISCECTOMY //  NDM    NECK SURGERY      gland removed at age 13 to r/o cancer     Family History   Problem Relation Age of Onset    Alcohol abuse Paternal Grandfather      Social History     Tobacco Use    Smoking status: Every Day     Current packs/day: 1.00     Average packs/day: 1 pack/day for 7.2 years (7.2 ttl pk-yrs)     Types: Cigarettes     Start date: 10/1/2016    Smokeless tobacco: Never   Substance Use Topics    Alcohol use: Yes     Comment: " occassionaly in a month    Drug use: No     Comment: only tried in the past     Review of Systems   Psychiatric/Behavioral:  The patient is nervous/anxious.    All other systems reviewed and are negative.      Physical Exam     Initial Vitals [12/29/23 1358]   BP Pulse Resp Temp SpO2   (!) 164/107 94 18 98.6 °F (37 °C) 100 %      MAP       --         Physical Exam    Nursing note and vitals reviewed.  Constitutional: Vital signs are normal. He appears well-developed and well-nourished.   HENT:   Head: Normocephalic and atraumatic.   Mouth/Throat: Oropharynx is clear and moist.   Eyes: Pupils are equal, round, and reactive to light.   Neck: Neck supple. No JVD present.   Cardiovascular:  Normal rate, regular rhythm and normal heart sounds.           Pulmonary/Chest: Breath sounds normal. No respiratory distress.   Abdominal: Abdomen is soft. There is no abdominal tenderness.   Musculoskeletal:         General: No edema.      Cervical back: Neck supple. No edema or erythema.     Lymphadenopathy:     He has no cervical adenopathy.   Neurological: He is alert and oriented to person, place, and time. No cranial nerve deficit. GCS score is 15. GCS eye subscore is 4. GCS verbal subscore is 5. GCS motor subscore is 6.   Skin: Skin is warm and dry. Capillary refill takes less than 2 seconds.   Psychiatric:   Polite and cooperative, suicidal ideation, no hallucinations, depressed, flat affect         ED Course   Procedures  Labs Reviewed   ACETAMINOPHEN LEVEL - Abnormal; Notable for the following components:       Result Value    Acetaminophen Level <17.4 (*)     All other components within normal limits   CBC WITH DIFFERENTIAL - Abnormal; Notable for the following components:    RBC 4.25 (*)     Hgb 13.3 (*)     Hct 40.0 (*)     MCV 94.1 (*)     MCH 31.3 (*)     All other components within normal limits   URINALYSIS, REFLEX TO URINE CULTURE - Normal   ALCOHOL,MEDICAL (ETHANOL) - Normal   TSH - Normal   COVID/RSV/FLU A&B PCR  - Normal    Narrative:     The Xpert Xpress SARS-CoV-2/FLU/RSV plus is a rapid, multiplexed real-time PCR test intended for the simultaneous qualitative detection and differentiation of SARS-CoV-2, Influenza A, Influenza B, and respiratory syncytial virus (RSV) viral RNA in either nasopharyngeal swab or nasal swab specimens.         DRUG SCREEN, URINE (BEAKER) - Normal    Narrative:     Cut off concentrations:    Amphetamines - 1000 ng/ml  Barbiturates - 200 ng/ml  Benzodiazepine - 200 ng/ml  Cannabinoids (THC) - 50 ng/ml  Cocaine - 300 ng/ml  Fentanyl - 1.0 ng/ml  MDMA - 500 ng/ml  Opiates - 300 ng/ml   Phencyclidine (PCP) - 25 ng/ml    Specimen submitted for drug analysis and tested for pH and specific gravity in order to evaluate sample integrity. Suspect tampering if specific gravity is <1.003 and/or pH is not within the range of 4.5 - 8.0  False negatives may result form substances such as bleach added to urine.  False positives may result for the presence of a substance with similar chemical structure to the drug or its metabolite.    This test provides only a PRELIMINARY analytical test result. A more specific alternate chemical method must be used in order to obtain a confirmed analytical result. Gas chromatography/mass spectrometry (GC/MS) is the preferred confirmatory method. Other chemical confirmation methods are available. Clinical consideration and professional judgement should be applied to any drug of abuse test result, particularly when preliminary positive results are used.    Positive results will be confirmed only at the physicians request. Unconfirmed screening results are to be used only for medical purposes (treatment).        COMPREHENSIVE METABOLIC PANEL   CBC W/ AUTO DIFFERENTIAL    Narrative:     The following orders were created for panel order CBC auto differential.  Procedure                               Abnormality         Status                     ---------                                -----------         ------                     CBC with Differential[[id number]]        Abnormal            Final result                 Please view results for these tests on the individual orders.     EKG Readings: (Independently Interpreted)   Initial Reading: No STEMI. Rhythm: Normal Sinus Rhythm. Heart Rate: 66. ST Segments: Normal ST Segments. T Waves: Normal. Clinical Impression: Normal Sinus Rhythm       Imaging Results    None          Medications - No data to display  Medical Decision Making  48-year-old male complains of suicidal ideation.  No specific plan.  No hallucinations but he feels like his life is out of control and and believes that if he stays at his house, he might hurt himself.  He has had previous psychiatric admissions and admits to a history of drug addiction.  He has no physical complaints.      Differential diagnosis includes but is not limited to suicidal ideation, substance abuse    Amount and/or Complexity of Data Reviewed  Labs: ordered.  Discussion of management or test interpretation with external provider(s): Patient was seen and evaluated in the emergency department with history, physical exam, placed under pec, and is now medically cleared 1721                  Medically cleared for psychiatry placement: 12/29/2023  5:22 PM                   Clinical Impression:  Final diagnoses:  [R45.851] Suicidal thoughts  [R45.851] Suicidal ideation (Primary)          ED Disposition Condition    Transfer to Psych Facility Stable          ED Prescriptions    None       Follow-up Information    None          Yancy Lacy MD  12/29/23 1727

## 2024-12-04 ENCOUNTER — HOSPITAL ENCOUNTER (EMERGENCY)
Facility: HOSPITAL | Age: 49
Discharge: HOME | End: 2024-12-04
Attending: EMERGENCY MEDICINE
Payer: MEDICAID

## 2024-12-04 VITALS
HEART RATE: 88 BPM | HEIGHT: 71 IN | DIASTOLIC BLOOD PRESSURE: 95 MMHG | OXYGEN SATURATION: 96 % | BODY MASS INDEX: 25.2 KG/M2 | SYSTOLIC BLOOD PRESSURE: 147 MMHG | WEIGHT: 180 LBS | TEMPERATURE: 99 F | RESPIRATION RATE: 18 BRPM

## 2024-12-04 DIAGNOSIS — M54.42 CHRONIC LEFT-SIDED LOW BACK PAIN WITH LEFT-SIDED SCIATICA: ICD-10-CM

## 2024-12-04 DIAGNOSIS — G89.29 CHRONIC LEFT-SIDED LOW BACK PAIN WITH LEFT-SIDED SCIATICA: ICD-10-CM

## 2024-12-04 DIAGNOSIS — N30.00 ACUTE CYSTITIS WITHOUT HEMATURIA: Primary | ICD-10-CM

## 2024-12-04 LAB
BACTERIA #/AREA URNS AUTO: ABNORMAL /HPF
BILIRUB UR QL STRIP.AUTO: NEGATIVE
CLARITY UR: ABNORMAL
COLOR UR AUTO: YELLOW
GLUCOSE UR QL STRIP: NEGATIVE
HGB UR QL STRIP: NEGATIVE
KETONES UR QL STRIP: NEGATIVE
LEUKOCYTE ESTERASE UR QL STRIP: ABNORMAL
NITRITE UR QL STRIP: POSITIVE
PH UR STRIP: 5.5 [PH]
PROT UR QL STRIP: NEGATIVE
RBC #/AREA URNS AUTO: ABNORMAL /HPF
SP GR UR STRIP.AUTO: >=1.03 (ref 1–1.03)
SQUAMOUS #/AREA URNS AUTO: ABNORMAL /HPF
UROBILINOGEN UR STRIP-ACNC: 0.2
WBC #/AREA URNS AUTO: ABNORMAL /HPF

## 2024-12-04 PROCEDURE — 81003 URINALYSIS AUTO W/O SCOPE: CPT | Performed by: EMERGENCY MEDICINE

## 2024-12-04 PROCEDURE — 96372 THER/PROPH/DIAG INJ SC/IM: CPT | Performed by: EMERGENCY MEDICINE

## 2024-12-04 PROCEDURE — 63600175 PHARM REV CODE 636 W HCPCS: Performed by: EMERGENCY MEDICINE

## 2024-12-04 PROCEDURE — 99284 EMERGENCY DEPT VISIT MOD MDM: CPT | Mod: 25

## 2024-12-04 PROCEDURE — 87086 URINE CULTURE/COLONY COUNT: CPT | Performed by: EMERGENCY MEDICINE

## 2024-12-04 RX ORDER — ORPHENADRINE CITRATE 30 MG/ML
60 INJECTION INTRAMUSCULAR; INTRAVENOUS
Status: COMPLETED | OUTPATIENT
Start: 2024-12-04 | End: 2024-12-04

## 2024-12-04 RX ORDER — DEXAMETHASONE SODIUM PHOSPHATE 4 MG/ML
8 INJECTION, SOLUTION INTRA-ARTICULAR; INTRALESIONAL; INTRAMUSCULAR; INTRAVENOUS; SOFT TISSUE
Status: COMPLETED | OUTPATIENT
Start: 2024-12-04 | End: 2024-12-04

## 2024-12-04 RX ORDER — NITROFURANTOIN 25; 75 MG/1; MG/1
100 CAPSULE ORAL 2 TIMES DAILY
Qty: 10 CAPSULE | Refills: 0 | Status: SHIPPED | OUTPATIENT
Start: 2024-12-04 | End: 2024-12-09

## 2024-12-04 RX ORDER — METHOCARBAMOL 750 MG/1
750 TABLET, FILM COATED ORAL 4 TIMES DAILY PRN
Qty: 40 TABLET | Refills: 0 | Status: SHIPPED | OUTPATIENT
Start: 2024-12-04 | End: 2024-12-14

## 2024-12-04 RX ADMIN — DEXAMETHASONE SODIUM PHOSPHATE 8 MG: 4 INJECTION, SOLUTION INTRA-ARTICULAR; INTRALESIONAL; INTRAMUSCULAR; INTRAVENOUS; SOFT TISSUE at 09:12

## 2024-12-04 RX ADMIN — ORPHENADRINE CITRATE 60 MG: 60 INJECTION INTRAMUSCULAR; INTRAVENOUS at 09:12

## 2024-12-04 NOTE — ED PROVIDER NOTES
Encounter Date: 12/4/2024       History     Chief Complaint   Patient presents with    Back Pain     Left sided back pain hx of Sciatica issues walks with walker     49-year-old male with a history of chronic back pain, neurogenic bladder, walks with a walker complains of left-sided back pain radiating into left posterior leg.  No falls or injuries.  He takes buprenorphine for pain and has been on this for 14 years.  He states that when he has a flare-up of pain, usually a muscle relaxant and steroid is helpful.  He also complains of bladder pain and believes he has a UTI.    The history is provided by the patient.     Review of patient's allergies indicates:   Allergen Reactions    Naproxen sodium      Other reaction(s): fast heart rate    Aspirin Palpitations     Per pt makes his heart race     Past Medical History:   Diagnosis Date    Addiction to drug     Anxiety     Depression     per pt denies hx of depression    Herniated thoracic disc without myelopathy     Pancreatitis     Self-catheterizes urinary bladder      Past Surgical History:   Procedure Laterality Date    MAGNETIC RESONANCE IMAGING N/A 7/20/2022    Procedure: MRI (MAGNETIC RESONANCE IMAGING);  Surgeon: Tony Trevino DO;  Location: [facility];  Service: Anesthesiology;  Laterality: N/A;  MRI LUMBAR SPINE WITH AND WITHOUT CONTRAST WITH ANESTHESIA    MINIMALLY INVASIVE SURGICAL REMOVAL OF INTERVERTEBRAL DISC OF SPINE USING MICROSCOPE N/A 7/22/2022    Procedure: DISCECTOMY, SPINE, MINIMALLY INVASIVE, USING MICROSCOPE;  Surgeon: Gypsy Dubois MD;  Location: [facility];  Service: Neurosurgery;  Laterality: N/A;  RIGHT   L4-5  MIS DISCECTOMY //  NDM    NECK SURGERY      gland removed at age 13 to r/o cancer     Family History   Problem Relation Name Age of Onset    Alcohol abuse Paternal Grandfather       Social History     Tobacco Use    Smoking status: Every Day     Current packs/day: 1.00     Average packs/day: 1 pack/day for 8.2 years (8.2 ttl pk-yrs)      Types: Cigarettes     Start date: 10/1/2016    Smokeless tobacco: Never   Substance Use Topics    Alcohol use: Yes     Comment: occassionaly in a month    Drug use: No     Comment: only tried in the past     Review of Systems   Genitourinary:  Positive for dysuria.   Musculoskeletal:  Positive for back pain.   All other systems reviewed and are negative.      Physical Exam     Initial Vitals [12/04/24 0753]   BP Pulse Resp Temp SpO2   (!) 147/95 88 18 98.8 °F (37.1 °C) 96 %      MAP       --         Physical Exam    Nursing note and vitals reviewed.  Constitutional: Vital signs are normal. He appears well-developed and well-nourished.   HENT:   Head: Normocephalic and atraumatic.   Eyes: EOM are normal. Pupils are equal, round, and reactive to light.   Neck: Neck supple.   Cardiovascular:  Normal rate and regular rhythm.           Pulmonary/Chest: Breath sounds normal. No respiratory distress.   Abdominal: Abdomen is soft. There is no abdominal tenderness.   Musculoskeletal:         General: No tenderness or edema. Normal range of motion.      Cervical back: Neck supple. No edema or erythema.      Comments: No CVA tenderness     Lymphadenopathy:     He has no cervical adenopathy.   Neurological: He is alert and oriented to person, place, and time.   Skin: Skin is warm and dry.   Psychiatric: He has a normal mood and affect.         ED Course   Procedures  Labs Reviewed   URINALYSIS, REFLEX TO URINE CULTURE - Abnormal       Result Value    Color, UA Yellow      Appearance, UA Cloudy (*)     Specific Gravity, UA >=1.030      pH, UA 5.5      Protein, UA Negative      Glucose, UA Negative      Ketones, UA Negative      Blood, UA Negative      Bilirubin, UA Negative      Urobilinogen, UA 0.2      Nitrites, UA Positive (*)     Leukocyte Esterase, UA Small (*)    URINALYSIS, MICROSCOPIC - Abnormal    Bacteria, UA Many (*)     RBC, UA None Seen      WBC, UA 21-50 (*)     Squamous Epithelial Cells, UA None Seen     CULTURE,  URINE          Imaging Results    None          Medications   dexAMETHasone injection 8 mg (8 mg Intramuscular Given 12/4/24 0900)   orphenadrine injection 60 mg (60 mg Intramuscular Given 12/4/24 0900)     Medical Decision Making  See HPI for narrative    Differential diagnosis includes but is not limited to UTI, acute exacerbation of chronic back pain, sciatica, failed back surgery    Problems Addressed:  Chronic left-sided low back pain with left-sided sciatica:     Details: Patient was seen and evaluated in the emergency department with complaints of left-sided back pain radiating down the left leg which he states is a flare-up of his chronic back pain.  He also has urinary symptoms.  He was noted to have a UTI.  He was given a shot of steroids and muscle relaxant in his request for his back pain and I will treat him with antibiotics and muscle relaxant.  Will follow up with his PCP for further care.  He self caths, discussed sterile technique    Amount and/or Complexity of Data Reviewed  Labs:  Decision-making details documented in ED Course.    Risk  Prescription drug management.               ED Course as of 12/04/24 1011   Wed Dec 04, 2024   0920 NITRITE UA(!): Positive [SH]   0920 Leukocyte Esterase, UA(!): Small [SH]      ED Course User Index  [SH] Yancy Lacy MD                           Clinical Impression:  Final diagnoses:  [N30.00] Acute cystitis without hematuria (Primary)  [M54.42, G89.29] Chronic left-sided low back pain with left-sided sciatica          ED Disposition Condition    Discharge Stable          ED Prescriptions       Medication Sig Dispense Start Date End Date Auth. Provider    nitrofurantoin, macrocrystal-monohydrate, (MACROBID) 100 MG capsule Take 1 capsule (100 mg total) by mouth 2 (two) times daily. for 5 days 10 capsule 12/4/2024 12/9/2024 Yancy Lacy MD    methocarbamoL (ROBAXIN) 750 MG Tab Take 1 tablet (750 mg total) by mouth 4 (four) times daily as needed  (back pain). 40 tablet 12/4/2024 12/14/2024 Yancy Lacy MD          Follow-up Information       Follow up With Specialties Details Why Contact Info    Lio Patel MD Family Medicine Schedule an appointment as soon as possible for a visit   [address] [place] [id number]  [phone]               Yancy Lacy MD  12/04/24 1011

## 2024-12-06 LAB — BACTERIA UR CULT: ABNORMAL

## 2024-12-15 NOTE — PROGRESS NOTES
"[facility]  Hospital Medicine Progress Note        Chief Complaint: Inpatient Follow-up for cauda equina syndrome    HPI:   47 y.o. male who  has a past medical history of Addiction to drug, Anxiety, Depression, Herniated thoracic disc without myelopathy, and Pancreatitis., HTN; presents to the ED at [facility] with reports of lower back pain and difficulty  Urinating.  Patient reports he has been having bowel and bladder dysfunction with retention over  The past 2 weeks.  His significant other is at the bedside (Andrae) who is providing some of the history.  It is reported patient "lifted up on an island"  and strained his lower back. IT is reported this happed about a couple of months ago.   Patient reports the symptoms of lower back pain progressed and got worse, along with urinary retention and no bowel movement over the past 2 weeks.  Patient tried using a suppository to facilitate a bowel movement however he reports he could not feel the suppository and it produced no relief. Pt also reports urinary incontinence as well with needing wear diapers.  PT presented to the ED at [facility]. Work up there revealed greater than 800 ml in patient bladder post ovid residual. Pt was transferred to [facility] for   Further imaging in neurosurgical evaluation.  Neurosurgery services were consulted. CT of his L spine was completed upon arrival and was significant for suspected right central disc protrusion at L4-5 with moderate narrowing of the canal and likely impingement of the descending right L5 nerve roots. MRI of the lumbar with and without contrast  demonstrated  Transitional lumbosacral anatomy with partial sacralization of L5, suspected right central disc protrusion at L4-L5 with moderate narrowing of canal and likely impingement of descending right L5 nerve root; moderate to severe neural foraminal narrowing on the right at L4-5 moderate on the left.   Patient reports no chest pain, " [TextBox_4] : Still has mild cough along with mild shortness of breath.  No chest pain, fever or chills. shortness a breath, fever, chills, cough, congestion, any sick contacts.  Patient is admitted to hospital medicine services for further management.  Neurosurgery services consult.    Interval Hx:  Patient's biggest complaint this morning is pain.  Family member at bedside.  No bowel movement since admission.  Otherwise stable.  Discussed the case with Neurosurgery and plan to take to the OR tomorrow morning.  Will be NPO after midnight    Objective/physical exam:  General: In no acute distress, afebrile  Chest: Clear to auscultation bilaterally  Heart: RRR, +S1, S2, no appreciable murmur  Abdomen: Soft, nontender, BS +  MSK: Warm, no lower extremity edema, no clubbing or cyanosis  Neurologic: Alert and oriented x4, Cranial nerve II-XII intact, Strength 5/5 in all 4 extremities    VITAL SIGNS: 24 HRS MIN & MAX LAST   Temp  Min: 98 °F (36.7 °C)  Max: 99 °F (37.2 °C) 98.4 °F (36.9 °C)   BP  Min: 117/73  Max: 163/85 117/73   Pulse  Min: 71  Max: 98  74   Resp  Min: 13  Max: 20 18   SpO2  Min: 95 %  Max: 99 % 99 %       Recent Labs   Lab 07/20/22  0212 07/21/22  0520   WBC 19.5* 6.7   RBC 4.75 4.76   HGB 14.6 14.6   HCT 45.8 46.5   MCV 96.4* 97.7*   MCH 30.7 30.7   MCHC 31.9* 31.4*   RDW 13.3 13.4    245   MPV 10.4 10.7*       Recent Labs   Lab 07/20/22  0212 07/21/22  0520    138   K 3.7 3.9   CO2 31* 29   BUN 17.3 10.4   CREATININE 1.03 0.91   CALCIUM 9.6 8.7   ALBUMIN 3.7 3.3*   ALKPHOS 68 59   ALT 16 12   AST 15 15   BILITOT 0.7 0.5          Microbiology Results (last 7 days)     Procedure Component Value Units Date/Time    Urine culture [[id number]]  (Abnormal) Collected: 07/20/22 0342    Order Status: Completed Specimen: Urine, Catheterized Updated: 07/21/22 0701     Urine Culture >/= 100,000 colonies/ml Gram-negative Rods    Blood Culture (site 1) [[id number]] Collected: 07/20/22 0931    Order Status: Resulted Specimen: Blood Updated: 07/20/22 0934    Blood Culture (site 2) [[id number]] Collected:  07/20/22 0931    Order Status: Resulted Specimen: Blood Updated: 07/20/22 0933           See below for Radiology    Scheduled Med:   cefTRIAXone (ROCEPHIN) IVPB  1 g Intravenous Q24H    gabapentin  200 mg Oral TID    nicotine  1 patch Transdermal Daily        Continuous Infusions:       PRN Meds:  acetaminophen, dextrose 10%, dextrose 10%, glucagon (human recombinant), glucose, glucose, hydrALAZINE, HYDROmorphone, melatonin, naloxone, ondansetron, oxyCODONE, simethicone, sodium chloride 0.9%       Assessment/Plan:   Acute cauda equina syndrome - suspected per MRI  Acute urinary retention  Acute lumbar stenosis- L4-5 with L5 root impingement  HTN- essential  Lumbar strain- per pt reports 2 week ago  Nicotine dependence- cigarettes     Hx of anxiety, depression, poly-substance abuse (denies any recent use)     Appreciate neurosurgery recommendations.  Plan to take to the OR tomorrow morning.  NPO after midnight.  SCDs for DVT prophylaxis for now.  Continue Torres catheter for neurogenic bladder.  On a bowel regimen  Adjust pain regimen this morning. Dilaudid 2mg q4 and percocet 10 q4, both prn     Patient condition:  Stable    Anticipated discharge and Disposition: TBD      All diagnosis and differential diagnosis have been reviewed; assessment and plan has been documented; I have personally reviewed the labs and test results that are presently available; I have reviewed the patients medication list; I have reviewed the consulting providers response and recommendations. I have reviewed or attempted to review medical records based upon their availability    All of the patient's questions have been  addressed and answered. Patient's is agreeable to the above stated plan. I will continue to monitor closely and make adjustments to medical management as needed.  _____________________________________________________________________      Radiology:  MRI Lumbar Spine W WO Cont  Narrative: EXAMINATION:  MRI LUMBAR SPINE W WO  CONTRAST    CLINICAL HISTORY:  Low back pain, cauda equina syndrome suspected;    TECHNIQUE:  Multiplanar multisequence MR images of the lumbar spine are obtained with and without contrast.    COMPARISON:  CT of the lumbar spine dated 07/20/2022    FINDINGS:  There is transitional lumbosacral anatomy with the lowest disc space labeled as L5-S1 and partial sacralization at L5. Alignment is normal without subluxation.  Vertebral body heights are maintained.  There is mild degenerate endplate edema at L4-5.    The conus terminates at the level of L1.  It is normal in signal and contour.  There is enhancement of the cauda equina nerve roots.    Disc spaces, spinal canal and neural foramina are as follows:    L1-L2: No disc herniation.  No significant spinal canal or neural foraminal stenosis.    L2-L3: Disc bulge and facet hypertrophy with mild narrowing of the spinal canal.  Mild bilateral neural foraminal stenosis.    L3-L4: Disc bulge and facet hypertrophy with mild narrowing of the spinal canal.  Mild bilateral neural foraminal stenosis.    L4-L5: A right central disc extrusion measures 9 x 12 x 17 mm in size and causes severe spinal canal stenosis with complete effacement of the CSF space and impingement on the cord equina nerve roots.  There is mild-to-moderate bilateral neural foraminal stenosis.    L5-S1: No disc herniation.  Bilateral facet hypertrophy.  No significant spinal canal or neural foraminal stenosis.    No significant abnormality within the visualized paraspinous musculature.  Impression: 1. 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  2. Large right central disc extrusion at L4-5 with severe canal stenosis.  3. Cauda equina nerve root enhancement is nonspecific and may be inflammatory.  4. Mild degenerative narrowing of the canal at L2-L3 and L4-5.  5. Multilevel neural foraminal stenoses as described.    Electronically signed by: Margo  Zakiya  Date:    07/20/2022  Time:    15:01  CT Lumbar Spine With Contrast  Narrative: EXAMINATION:  CT LUMBAR SPINE WITH CONTRAST    CLINICAL HISTORY:  Low back pain, cauda equina syndrome suspected;    TECHNIQUE:  CT images of the lumbar spine with contrast.  Axial, coronal, and sagittal reformatted images were obtained. Dose length product is 622 mGycm. Automatic exposure control, adjustment of mA/kV or iterative reconstruction technique was used to limit radiation dose.    COMPARISON:  CT abdomen pelvis dated 12/27/2022    FINDINGS:  There is transitional lumbosacral anatomy with the lowest fully formed disc space labeled as L5-S1 and partial sacralization of L5.  Lumbar alignment is normal.  The vertebral body heights are maintained. There is no acute fracture identified.  There is mild disc height loss at L4-5 and L5-S1 with small multilevel marginal osteophytes.  There is mild facet arthropathy. There is mild degenerative narrowing of the canal at L2-L3 and L3-L4 with disc bulges and facet hypertrophy.  At L4-L5, there is a disc bulge with right central disc protrusion which causes moderate narrowing of the canal and likely impingement on the descending right L5 nerve roots.  There is moderate to severe narrowing of the right L4-L5 neural foramina, moderate on the left.  The paraspinal soft tissues are unremarkable.  There is no drainable fluid collection.  Impression: 1. 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  2. Suspected right central disc protrusion at L4-L5 with moderate narrowing of the canal and likely impingement on the descending right L5 nerve roots.  3. Moderate to severe neural foraminal narrowing on the right at L4-L5, moderate on the left.    Electronically signed by: Margo Sigala  Date:    07/20/2022  Time:    08:07      Louis Angeles MD   07/21/2022

## (undated) DEVICE — Device

## (undated) DEVICE — ELECTRODE BLD EXT 6.50 ST DISP

## (undated) DEVICE — TUBING SILICON CLR 3/16IN 10FT

## (undated) DEVICE — CLOSURE SKIN STERI STRIP 1/2X4

## (undated) DEVICE — SUT BONE WAX 2.5 GRMS 12/BX

## (undated) DEVICE — DRAIN JACKSON-PRATT NO TRCR 7F

## (undated) DEVICE — RESERVOIR JACKSON-PRATT 100CC

## (undated) DEVICE — DRESSING TELFA N ADH 3X8IN

## (undated) DEVICE — COVER C-ARM STRAP BAND 44X80IN

## (undated) DEVICE — DRAPE EQUIP BTTN WALTERLORENZ

## (undated) DEVICE — GLOVE PROTEXIS PI SYN SURG 6.5

## (undated) DEVICE — SEE MEDLINE ITEM 157150

## (undated) DEVICE — KIT POS JACKSON TABLE NO HDRST

## (undated) DEVICE — HEAD PRECISION MATCH LILAC 2.5

## (undated) DEVICE — SEE MEDLINE ITEM 157166

## (undated) DEVICE — GOWN SMARTSLEEVE AAMI LVL4 LG

## (undated) DEVICE — NDL HYPO 22GX1 1/2 SYR 10ML LL

## (undated) DEVICE — KIT SURGIFLO HEMOSTATIC MATRIX

## (undated) DEVICE — BENZOIN TINCTURE 0.66ML

## (undated) DEVICE — DRAPE SURG W/TWL 17 5/8X23

## (undated) DEVICE — DRAPE C-ARMOR EQUIPMENT COVER

## (undated) DEVICE — SUT VICRYL 4-0 18 P-3

## (undated) DEVICE — SUT VICRYL+ 27 UR-6 VIOL

## (undated) DEVICE — DRESSING TELFA N ADH 3X8

## (undated) DEVICE — DISH PETRI MED 3.5IN

## (undated) DEVICE — ELECTRODE PATIENT RETURN DISP

## (undated) DEVICE — GLOVE PROTEXIS BLUE LATEX 8

## (undated) DEVICE — SUT VICRYL PLUS 3-0 X-1 18IN

## (undated) DEVICE — APPLICATOR CHLORAPREP ORN 26ML

## (undated) DEVICE — GLOVE PROTEXIS LTX MICRO  7.5

## (undated) DEVICE — PILLOW HEAD REST

## (undated) DEVICE — INSTRUMENT FRAZIER 10FR W/VENT

## (undated) DEVICE — SOL NACL IRR 1000ML BTL

## (undated) DEVICE — GLOVE PROTEXIS BLUE LATEX 7

## (undated) DEVICE — DRAPE OPMI STERILE

## (undated) DEVICE — SHEET AVIENE MICFIB 1.4X1.4IN